# Patient Record
Sex: FEMALE | Race: WHITE | NOT HISPANIC OR LATINO | URBAN - METROPOLITAN AREA
[De-identification: names, ages, dates, MRNs, and addresses within clinical notes are randomized per-mention and may not be internally consistent; named-entity substitution may affect disease eponyms.]

---

## 2020-04-27 ENCOUNTER — TELEMEDICINE (OUTPATIENT)
Dept: FAMILY MEDICINE CLINIC | Facility: CLINIC | Age: 60
End: 2020-04-27
Payer: COMMERCIAL

## 2020-04-27 VITALS — HEIGHT: 65 IN | BODY MASS INDEX: 24.66 KG/M2 | WEIGHT: 148 LBS

## 2020-04-27 DIAGNOSIS — F41.1 ANXIETY, GENERALIZED: Primary | ICD-10-CM

## 2020-04-27 DIAGNOSIS — F33.1 MAJOR DEPRESSIVE DISORDER, RECURRENT, MODERATE (HCC): ICD-10-CM

## 2020-04-27 PROCEDURE — 99203 OFFICE O/P NEW LOW 30 MIN: CPT | Performed by: FAMILY MEDICINE

## 2020-04-27 RX ORDER — ESCITALOPRAM OXALATE 10 MG/1
TABLET ORAL
Qty: 30 TABLET | Refills: 1 | Status: SHIPPED | OUTPATIENT
Start: 2020-04-27 | End: 2020-06-08 | Stop reason: SDUPTHER

## 2020-04-27 RX ORDER — HYDROXYZINE HYDROCHLORIDE 25 MG/1
25 TABLET, FILM COATED ORAL EVERY 6 HOURS PRN
Qty: 30 TABLET | Refills: 0 | Status: SHIPPED | OUTPATIENT
Start: 2020-04-27

## 2020-05-11 ENCOUNTER — TELEMEDICINE (OUTPATIENT)
Dept: FAMILY MEDICINE CLINIC | Facility: CLINIC | Age: 60
End: 2020-05-11
Payer: COMMERCIAL

## 2020-05-11 VITALS — HEIGHT: 65 IN | WEIGHT: 148 LBS | BODY MASS INDEX: 24.66 KG/M2

## 2020-05-11 DIAGNOSIS — Z12.31 SCREENING MAMMOGRAM, ENCOUNTER FOR: ICD-10-CM

## 2020-05-11 DIAGNOSIS — F41.1 ANXIETY, GENERALIZED: Primary | ICD-10-CM

## 2020-05-11 DIAGNOSIS — Z80.0 FAMILY HISTORY OF COLON CANCER REQUIRING SCREENING COLONOSCOPY: ICD-10-CM

## 2020-05-11 DIAGNOSIS — Z12.4 CERVICAL CANCER SCREENING: ICD-10-CM

## 2020-05-11 DIAGNOSIS — F33.1 MAJOR DEPRESSIVE DISORDER, RECURRENT, MODERATE (HCC): ICD-10-CM

## 2020-05-11 PROCEDURE — 99214 OFFICE O/P EST MOD 30 MIN: CPT | Performed by: FAMILY MEDICINE

## 2020-06-08 ENCOUNTER — TELEMEDICINE (OUTPATIENT)
Dept: FAMILY MEDICINE CLINIC | Facility: CLINIC | Age: 60
End: 2020-06-08
Payer: COMMERCIAL

## 2020-06-08 VITALS — WEIGHT: 148 LBS | BODY MASS INDEX: 24.66 KG/M2 | HEIGHT: 65 IN

## 2020-06-08 DIAGNOSIS — F41.1 ANXIETY, GENERALIZED: ICD-10-CM

## 2020-06-08 DIAGNOSIS — F33.1 MAJOR DEPRESSIVE DISORDER, RECURRENT, MODERATE (HCC): ICD-10-CM

## 2020-06-08 PROCEDURE — 3008F BODY MASS INDEX DOCD: CPT | Performed by: FAMILY MEDICINE

## 2020-06-08 PROCEDURE — 99213 OFFICE O/P EST LOW 20 MIN: CPT | Performed by: FAMILY MEDICINE

## 2020-06-08 RX ORDER — DIPHENOXYLATE HYDROCHLORIDE AND ATROPINE SULFATE 2.5; .025 MG/1; MG/1
1 TABLET ORAL DAILY
COMMUNITY

## 2020-06-08 RX ORDER — ESCITALOPRAM OXALATE 10 MG/1
10 TABLET ORAL DAILY
Qty: 90 TABLET | Refills: 1 | Status: SHIPPED | OUTPATIENT
Start: 2020-06-08

## 2021-06-16 ENCOUNTER — TELEPHONE (OUTPATIENT)
Dept: FAMILY MEDICINE CLINIC | Facility: CLINIC | Age: 61
End: 2021-06-16

## 2021-06-16 NOTE — TELEPHONE ENCOUNTER
Patient is overdue for annual physical  Please call to schedule  Please confirm patient is still seeing our office as their PCP  If they patient is seeing another provider please obtain new PCP information and route back to me directly

## 2021-06-25 ENCOUNTER — TELEPHONE (OUTPATIENT)
Dept: FAMILY MEDICINE CLINIC | Facility: CLINIC | Age: 61
End: 2021-06-25

## 2021-06-25 NOTE — TELEPHONE ENCOUNTER
MALATHI and letter sent out    Dear Ms Jimenez:    A recent review of your medical records indicate that you are now due for your annual exam     Please call our office at your earliest convenience to schedule an appointment  If you have already had the appropriate care elsewhere, please call our office and provide us with that information so we can update your records  Your partners in [facility],    Marce Ac, [facility] Aguilar, Flavio Ariza, and Jatin Laughlin

## 2022-03-17 ENCOUNTER — TELEPHONE (OUTPATIENT)
Dept: FAMILY MEDICINE CLINIC | Facility: CLINIC | Age: 62
End: 2022-03-17

## 2022-03-22 NOTE — TELEPHONE ENCOUNTER
MALATHI and sent my chart message for patient to please contact the office to schedule this appointment or to please let us know if patient is still continuing with Dr Jadon Sarabia or not then we would take her off the call list

## 2025-05-27 ENCOUNTER — APPOINTMENT (EMERGENCY)
Dept: RADIOLOGY | Facility: HOSPITAL | Age: 65
DRG: 291 | End: 2025-05-27
Payer: MEDICARE

## 2025-05-27 ENCOUNTER — APPOINTMENT (EMERGENCY)
Dept: VASCULAR ULTRASOUND | Facility: HOSPITAL | Age: 65
DRG: 291 | End: 2025-05-27
Payer: MEDICARE

## 2025-05-27 ENCOUNTER — HOSPITAL ENCOUNTER (INPATIENT)
Facility: HOSPITAL | Age: 65
LOS: 4 days | Discharge: HOME | DRG: 291 | End: 2025-05-31
Attending: EMERGENCY MEDICINE | Admitting: INTERNAL MEDICINE
Payer: MEDICARE

## 2025-05-27 ENCOUNTER — APPOINTMENT (EMERGENCY)
Dept: CT IMAGING | Facility: HOSPITAL | Age: 65
DRG: 291 | End: 2025-05-27
Payer: MEDICARE

## 2025-05-27 DIAGNOSIS — I48.91 A-FIB (HCC): ICD-10-CM

## 2025-05-27 DIAGNOSIS — J90 PLEURAL EFFUSION: ICD-10-CM

## 2025-05-27 DIAGNOSIS — F10.90 ALCOHOL USE DISORDER: Primary | ICD-10-CM

## 2025-05-27 DIAGNOSIS — I50.9 CHF (CONGESTIVE HEART FAILURE) (HCC): ICD-10-CM

## 2025-05-27 LAB
2HR DELTA HS TROPONIN: -1 NG/L
ALBUMIN SERPL BCG-MCNC: 3.9 G/DL (ref 3.5–5)
ALBUMIN SERPL BCG-MCNC: 4 G/DL (ref 3.5–5)
ALP SERPL-CCNC: 65 U/L (ref 34–104)
ALP SERPL-CCNC: 68 U/L (ref 34–104)
ALT SERPL W P-5'-P-CCNC: 54 U/L (ref 7–52)
ALT SERPL W P-5'-P-CCNC: 55 U/L (ref 7–52)
ANION GAP SERPL CALCULATED.3IONS-SCNC: 10 MMOL/L (ref 4–13)
ANION GAP SERPL CALCULATED.3IONS-SCNC: 11 MMOL/L (ref 4–13)
APTT PPP: 27 SECONDS (ref 23–34)
AST SERPL W P-5'-P-CCNC: 40 U/L (ref 13–39)
AST SERPL W P-5'-P-CCNC: 51 U/L (ref 13–39)
BASE EX.OXY STD BLDV CALC-SCNC: 78.4 % (ref 60–80)
BASE EXCESS BLDV CALC-SCNC: -4.9 MMOL/L
BASOPHILS # BLD AUTO: 0.02 THOUSANDS/ÂΜL (ref 0–0.1)
BASOPHILS # BLD AUTO: 0.03 THOUSANDS/ÂΜL (ref 0–0.1)
BASOPHILS NFR BLD AUTO: 0 % (ref 0–1)
BASOPHILS NFR BLD AUTO: 1 % (ref 0–1)
BILIRUB SERPL-MCNC: 2.24 MG/DL (ref 0.2–1)
BILIRUB SERPL-MCNC: 2.43 MG/DL (ref 0.2–1)
BNP SERPL-MCNC: 647 PG/ML (ref 0–100)
BUN SERPL-MCNC: 9 MG/DL (ref 5–25)
BUN SERPL-MCNC: 9 MG/DL (ref 5–25)
CA-I BLD-SCNC: 1.12 MMOL/L (ref 1.12–1.32)
CALCIUM SERPL-MCNC: 8.6 MG/DL (ref 8.4–10.2)
CALCIUM SERPL-MCNC: 8.8 MG/DL (ref 8.4–10.2)
CARDIAC TROPONIN I PNL SERPL HS: 19 NG/L (ref ?–50)
CARDIAC TROPONIN I PNL SERPL HS: 20 NG/L (ref ?–50)
CHLORIDE SERPL-SCNC: 104 MMOL/L (ref 96–108)
CHLORIDE SERPL-SCNC: 107 MMOL/L (ref 96–108)
CO2 SERPL-SCNC: 22 MMOL/L (ref 21–32)
CO2 SERPL-SCNC: 23 MMOL/L (ref 21–32)
CREAT SERPL-MCNC: 0.57 MG/DL (ref 0.6–1.3)
CREAT SERPL-MCNC: 0.66 MG/DL (ref 0.6–1.3)
EOSINOPHIL # BLD AUTO: 0.02 THOUSAND/ÂΜL (ref 0–0.61)
EOSINOPHIL # BLD AUTO: 0.09 THOUSAND/ÂΜL (ref 0–0.61)
EOSINOPHIL NFR BLD AUTO: 0 % (ref 0–6)
EOSINOPHIL NFR BLD AUTO: 2 % (ref 0–6)
ERYTHROCYTE [DISTWIDTH] IN BLOOD BY AUTOMATED COUNT: 13.7 % (ref 11.6–15.1)
ERYTHROCYTE [DISTWIDTH] IN BLOOD BY AUTOMATED COUNT: 13.8 % (ref 11.6–15.1)
GFR SERPL CREATININE-BSD FRML MDRD: 92 ML/MIN/1.73SQ M
GFR SERPL CREATININE-BSD FRML MDRD: 97 ML/MIN/1.73SQ M
GLUCOSE SERPL-MCNC: 117 MG/DL (ref 65–140)
GLUCOSE SERPL-MCNC: 93 MG/DL (ref 65–140)
HCO3 BLDV-SCNC: 18.9 MMOL/L (ref 24–30)
HCT VFR BLD AUTO: 38.9 % (ref 34.8–46.1)
HCT VFR BLD AUTO: 40.5 % (ref 34.8–46.1)
HGB BLD-MCNC: 12.7 G/DL (ref 11.5–15.4)
HGB BLD-MCNC: 13.1 G/DL (ref 11.5–15.4)
IMM GRANULOCYTES # BLD AUTO: 0.01 THOUSAND/UL (ref 0–0.2)
IMM GRANULOCYTES # BLD AUTO: 0.01 THOUSAND/UL (ref 0–0.2)
IMM GRANULOCYTES NFR BLD AUTO: 0 % (ref 0–2)
IMM GRANULOCYTES NFR BLD AUTO: 0 % (ref 0–2)
INR PPP: 1.16 (ref 0.85–1.19)
LACTATE SERPL-SCNC: 1.2 MMOL/L (ref 0.5–2)
LYMPHOCYTES # BLD AUTO: 0.7 THOUSANDS/ÂΜL (ref 0.6–4.47)
LYMPHOCYTES # BLD AUTO: 1.18 THOUSANDS/ÂΜL (ref 0.6–4.47)
LYMPHOCYTES NFR BLD AUTO: 14 % (ref 14–44)
LYMPHOCYTES NFR BLD AUTO: 24 % (ref 14–44)
MAGNESIUM SERPL-MCNC: 1.7 MG/DL (ref 1.9–2.7)
MCH RBC QN AUTO: 34.2 PG (ref 26.8–34.3)
MCH RBC QN AUTO: 34.4 PG (ref 26.8–34.3)
MCHC RBC AUTO-ENTMCNC: 32.3 G/DL (ref 31.4–37.4)
MCHC RBC AUTO-ENTMCNC: 32.6 G/DL (ref 31.4–37.4)
MCV RBC AUTO: 105 FL (ref 82–98)
MCV RBC AUTO: 106 FL (ref 82–98)
MONOCYTES # BLD AUTO: 0.59 THOUSAND/ÂΜL (ref 0.17–1.22)
MONOCYTES # BLD AUTO: 0.65 THOUSAND/ÂΜL (ref 0.17–1.22)
MONOCYTES NFR BLD AUTO: 12 % (ref 4–12)
MONOCYTES NFR BLD AUTO: 13 % (ref 4–12)
NEUTROPHILS # BLD AUTO: 3.01 THOUSANDS/ÂΜL (ref 1.85–7.62)
NEUTROPHILS # BLD AUTO: 3.65 THOUSANDS/ÂΜL (ref 1.85–7.62)
NEUTS SEG NFR BLD AUTO: 61 % (ref 43–75)
NEUTS SEG NFR BLD AUTO: 73 % (ref 43–75)
NRBC BLD AUTO-RTO: 0 /100 WBCS
NRBC BLD AUTO-RTO: 0 /100 WBCS
O2 CT BLDV-SCNC: 14.6 ML/DL
PCO2 BLDV: 31.8 MM HG (ref 42–50)
PH BLDV: 7.39 [PH] (ref 7.3–7.4)
PHOSPHATE SERPL-MCNC: 3.7 MG/DL (ref 2.3–4.1)
PLATELET # BLD AUTO: 108 THOUSANDS/UL (ref 149–390)
PLATELET # BLD AUTO: 124 THOUSANDS/UL (ref 149–390)
PMV BLD AUTO: 10.7 FL (ref 8.9–12.7)
PMV BLD AUTO: 10.7 FL (ref 8.9–12.7)
PO2 BLDV: 45.4 MM HG (ref 35–45)
POTASSIUM SERPL-SCNC: 3.8 MMOL/L (ref 3.5–5.3)
POTASSIUM SERPL-SCNC: 5.1 MMOL/L (ref 3.5–5.3)
PROT SERPL-MCNC: 6.1 G/DL (ref 6.4–8.4)
PROT SERPL-MCNC: 6.1 G/DL (ref 6.4–8.4)
PROTHROMBIN TIME: 15.6 SECONDS (ref 12.3–15)
RBC # BLD AUTO: 3.71 MILLION/UL (ref 3.81–5.12)
RBC # BLD AUTO: 3.81 MILLION/UL (ref 3.81–5.12)
SODIUM SERPL-SCNC: 137 MMOL/L (ref 135–147)
SODIUM SERPL-SCNC: 140 MMOL/L (ref 135–147)
WBC # BLD AUTO: 4.91 THOUSAND/UL (ref 4.31–10.16)
WBC # BLD AUTO: 5.05 THOUSAND/UL (ref 4.31–10.16)

## 2025-05-27 PROCEDURE — 96374 THER/PROPH/DIAG INJ IV PUSH: CPT

## 2025-05-27 PROCEDURE — 80053 COMPREHEN METABOLIC PANEL: CPT

## 2025-05-27 PROCEDURE — 85610 PROTHROMBIN TIME: CPT | Performed by: EMERGENCY MEDICINE

## 2025-05-27 PROCEDURE — 93005 ELECTROCARDIOGRAM TRACING: CPT

## 2025-05-27 PROCEDURE — 93971 EXTREMITY STUDY: CPT | Performed by: SURGERY

## 2025-05-27 PROCEDURE — 84100 ASSAY OF PHOSPHORUS: CPT

## 2025-05-27 PROCEDURE — 96375 TX/PRO/DX INJ NEW DRUG ADDON: CPT

## 2025-05-27 PROCEDURE — 99285 EMERGENCY DEPT VISIT HI MDM: CPT

## 2025-05-27 PROCEDURE — 84484 ASSAY OF TROPONIN QUANT: CPT

## 2025-05-27 PROCEDURE — 96361 HYDRATE IV INFUSION ADD-ON: CPT

## 2025-05-27 PROCEDURE — 36415 COLL VENOUS BLD VENIPUNCTURE: CPT

## 2025-05-27 PROCEDURE — 94002 VENT MGMT INPAT INIT DAY: CPT

## 2025-05-27 PROCEDURE — 85025 COMPLETE CBC W/AUTO DIFF WBC: CPT

## 2025-05-27 PROCEDURE — 94760 N-INVAS EAR/PLS OXIMETRY 1: CPT

## 2025-05-27 PROCEDURE — 71046 X-RAY EXAM CHEST 2 VIEWS: CPT

## 2025-05-27 PROCEDURE — 5A09357 ASSISTANCE WITH RESPIRATORY VENTILATION, LESS THAN 24 CONSECUTIVE HOURS, CONTINUOUS POSITIVE AIRWAY PRESSURE: ICD-10-PCS | Performed by: INTERNAL MEDICINE

## 2025-05-27 PROCEDURE — 99291 CRITICAL CARE FIRST HOUR: CPT | Performed by: EMERGENCY MEDICINE

## 2025-05-27 PROCEDURE — 83605 ASSAY OF LACTIC ACID: CPT

## 2025-05-27 PROCEDURE — 83735 ASSAY OF MAGNESIUM: CPT

## 2025-05-27 PROCEDURE — 96376 TX/PRO/DX INJ SAME DRUG ADON: CPT

## 2025-05-27 PROCEDURE — 96365 THER/PROPH/DIAG IV INF INIT: CPT

## 2025-05-27 PROCEDURE — 93971 EXTREMITY STUDY: CPT

## 2025-05-27 PROCEDURE — 85730 THROMBOPLASTIN TIME PARTIAL: CPT | Performed by: EMERGENCY MEDICINE

## 2025-05-27 PROCEDURE — 83880 ASSAY OF NATRIURETIC PEPTIDE: CPT | Performed by: EMERGENCY MEDICINE

## 2025-05-27 PROCEDURE — 82805 BLOOD GASES W/O2 SATURATION: CPT | Performed by: EMERGENCY MEDICINE

## 2025-05-27 PROCEDURE — 82330 ASSAY OF CALCIUM: CPT

## 2025-05-27 PROCEDURE — NC001 PR NO CHARGE: Performed by: INTERNAL MEDICINE

## 2025-05-27 PROCEDURE — 71275 CT ANGIOGRAPHY CHEST: CPT

## 2025-05-27 RX ORDER — DILTIAZEM HYDROCHLORIDE 5 MG/ML
20 INJECTION INTRAVENOUS ONCE
Status: DISCONTINUED | OUTPATIENT
Start: 2025-05-27 | End: 2025-05-27

## 2025-05-27 RX ORDER — FUROSEMIDE 10 MG/ML
40 INJECTION INTRAMUSCULAR; INTRAVENOUS ONCE
Status: COMPLETED | OUTPATIENT
Start: 2025-05-27 | End: 2025-05-27

## 2025-05-27 RX ORDER — METOPROLOL TARTRATE 1 MG/ML
5 INJECTION, SOLUTION INTRAVENOUS EVERY 6 HOURS
Status: DISCONTINUED | OUTPATIENT
Start: 2025-05-28 | End: 2025-05-27

## 2025-05-27 RX ORDER — HEPARIN SODIUM 1000 [USP'U]/ML
2000 INJECTION, SOLUTION INTRAVENOUS; SUBCUTANEOUS EVERY 6 HOURS PRN
Status: DISCONTINUED | OUTPATIENT
Start: 2025-05-27 | End: 2025-05-30

## 2025-05-27 RX ORDER — METOPROLOL TARTRATE 1 MG/ML
5 INJECTION, SOLUTION INTRAVENOUS EVERY 6 HOURS
Status: DISCONTINUED | OUTPATIENT
Start: 2025-05-28 | End: 2025-05-28

## 2025-05-27 RX ORDER — CHLORHEXIDINE GLUCONATE ORAL RINSE 1.2 MG/ML
15 SOLUTION DENTAL EVERY 12 HOURS SCHEDULED
Status: DISCONTINUED | OUTPATIENT
Start: 2025-05-27 | End: 2025-05-28

## 2025-05-27 RX ORDER — HEPARIN SODIUM 10000 [USP'U]/100ML
3-20 INJECTION, SOLUTION INTRAVENOUS
Status: DISCONTINUED | OUTPATIENT
Start: 2025-05-27 | End: 2025-05-30

## 2025-05-27 RX ORDER — HYDROXYZINE HYDROCHLORIDE 25 MG/1
25 TABLET, FILM COATED ORAL EVERY 6 HOURS PRN
Status: DISCONTINUED | OUTPATIENT
Start: 2025-05-27 | End: 2025-05-28

## 2025-05-27 RX ORDER — METOPROLOL TARTRATE 1 MG/ML
2.5 INJECTION, SOLUTION INTRAVENOUS EVERY 6 HOURS
Status: DISCONTINUED | OUTPATIENT
Start: 2025-05-27 | End: 2025-05-27

## 2025-05-27 RX ORDER — DILTIAZEM HYDROCHLORIDE 5 MG/ML
15 INJECTION INTRAVENOUS ONCE
Status: COMPLETED | OUTPATIENT
Start: 2025-05-27 | End: 2025-05-27

## 2025-05-27 RX ORDER — ESCITALOPRAM OXALATE 10 MG/1
10 TABLET ORAL DAILY
Status: DISCONTINUED | OUTPATIENT
Start: 2025-05-28 | End: 2025-05-28

## 2025-05-27 RX ORDER — MAGNESIUM SULFATE HEPTAHYDRATE 40 MG/ML
2 INJECTION, SOLUTION INTRAVENOUS ONCE
Status: COMPLETED | OUTPATIENT
Start: 2025-05-27 | End: 2025-05-27

## 2025-05-27 RX ORDER — HEPARIN SODIUM 1000 [USP'U]/ML
4000 INJECTION, SOLUTION INTRAVENOUS; SUBCUTANEOUS EVERY 6 HOURS PRN
Status: DISCONTINUED | OUTPATIENT
Start: 2025-05-27 | End: 2025-05-30

## 2025-05-27 RX ORDER — HEPARIN SODIUM 1000 [USP'U]/ML
4000 INJECTION, SOLUTION INTRAVENOUS; SUBCUTANEOUS ONCE
Status: COMPLETED | OUTPATIENT
Start: 2025-05-27 | End: 2025-05-27

## 2025-05-27 RX ORDER — METOPROLOL TARTRATE 1 MG/ML
5 INJECTION, SOLUTION INTRAVENOUS ONCE
Status: DISCONTINUED | OUTPATIENT
Start: 2025-05-27 | End: 2025-05-27

## 2025-05-27 RX ORDER — DILTIAZEM HYDROCHLORIDE 5 MG/ML
INJECTION INTRAVENOUS
Status: COMPLETED
Start: 2025-05-27 | End: 2025-05-27

## 2025-05-27 RX ORDER — FOLIC ACID 1 MG/1
1 TABLET ORAL DAILY
Status: DISCONTINUED | OUTPATIENT
Start: 2025-05-27 | End: 2025-05-27

## 2025-05-27 RX ORDER — LANOLIN ALCOHOL/MO/W.PET/CERES
100 CREAM (GRAM) TOPICAL DAILY
Status: DISCONTINUED | OUTPATIENT
Start: 2025-05-31 | End: 2025-05-31 | Stop reason: HOSPADM

## 2025-05-27 RX ORDER — LANOLIN ALCOHOL/MO/W.PET/CERES
100 CREAM (GRAM) TOPICAL DAILY
Status: DISCONTINUED | OUTPATIENT
Start: 2025-05-27 | End: 2025-05-27

## 2025-05-27 RX ADMIN — Medication 100 MG: at 14:41

## 2025-05-27 RX ADMIN — FOLIC ACID 1 MG: 1 TABLET ORAL at 14:41

## 2025-05-27 RX ADMIN — IOHEXOL 85 ML: 350 INJECTION, SOLUTION INTRAVENOUS at 15:18

## 2025-05-27 RX ADMIN — FUROSEMIDE 40 MG: 10 INJECTION, SOLUTION INTRAMUSCULAR; INTRAVENOUS at 19:04

## 2025-05-27 RX ADMIN — METOROPROLOL TARTRATE 2.5 MG: 5 INJECTION, SOLUTION INTRAVENOUS at 22:24

## 2025-05-27 RX ADMIN — DILTIAZEM HYDROCHLORIDE 15 MG: 5 INJECTION, SOLUTION INTRAVENOUS at 15:37

## 2025-05-27 RX ADMIN — SODIUM CHLORIDE 1000 ML: 0.9 INJECTION, SOLUTION INTRAVENOUS at 14:00

## 2025-05-27 RX ADMIN — HEPARIN SODIUM 12 UNITS/KG/HR: 10000 INJECTION, SOLUTION INTRAVENOUS at 20:15

## 2025-05-27 RX ADMIN — MULTIPLE VITAMINS W/ MINERALS TAB 1 TABLET: TAB ORAL at 14:41

## 2025-05-27 RX ADMIN — SODIUM CHLORIDE 569 MG: 9 INJECTION, SOLUTION INTRAVENOUS at 14:37

## 2025-05-27 RX ADMIN — METOROPROLOL TARTRATE 5 MG: 5 INJECTION, SOLUTION INTRAVENOUS at 23:53

## 2025-05-27 RX ADMIN — METOROPROLOL TARTRATE 2.5 MG: 5 INJECTION, SOLUTION INTRAVENOUS at 16:50

## 2025-05-27 RX ADMIN — SODIUM CHLORIDE 10 MG/HR: 9 INJECTION, SOLUTION INTRAVENOUS at 15:40

## 2025-05-27 RX ADMIN — HEPARIN SODIUM 4000 UNITS: 1000 INJECTION, SOLUTION INTRAVENOUS; SUBCUTANEOUS at 20:15

## 2025-05-27 RX ADMIN — MAGNESIUM SULFATE HEPTAHYDRATE 2 G: 40 INJECTION, SOLUTION INTRAVENOUS at 21:25

## 2025-05-27 NOTE — ED PROVIDER NOTES
Time reflects when diagnosis was documented in both MDM as applicable and the Disposition within this note       Time User Action Codes Description Comment    5/27/2025  7:02 PM Angelina Mattson Add [J90] Pleural effusion     5/27/2025  9:11 PM Angelina Mattson Add [I48.91] A-fib ([facility])     5/27/2025  9:11 PM Angelina Mattson Add [I50.9] CHF (congestive heart failure) ([facility])     5/28/2025  6:00 AM Mirlande Moran Modify [J90] Pleural effusion     5/28/2025  6:00 AM Mirlande Moran Add [F10.90] Alcohol use disorder           ED Disposition       ED Disposition   Admit    Condition   Stable    Date/Time   Tue May 27, 2025  6:47 PM    Comment   Case was discussed with Critical Care and the patient's admission status was agreed to be Admission Status: inpatient status to the service of Dr. Lyon.               Assessment & Plan       Medical Decision Making  Kourtney Jimenez is a 64 y.o. female with a past medical history of anxiety, depression being evaluated for rapid heart rate, chest pain, shortness of breath.    Differential diagnoses include but not limited to: DVT/PE, A-fib, SVT, pneumonia, alcohol withdrawal, electrolyte abnormality    Initial workup to include: CBC, CMP, troponin, EKG, venous duplex, CT PE    See ED Course for data/imaging interpretation and further MDM.    Disposition: On exam, patient initially tachycardic to 160s with no prior history of A-fib.  Shortness of breath with pleuritic chest pain and left lower extremity swelling concerning for DVT/PE.  Venous duplex of left lower extremity without evidence of DVT.  CT PE study without evidence of PE but notable for moderate bilateral effusions with groundglass haziness in the lungs concerning for possible CHF.  IV diuresis started with 40 mg of Lasix.  Patient given Cardizem bolus via EMS prior to arrival in ED, persistently tachycardic.  Given additional 15 mg Cardizem bolus and started on drip.  Patient also endorses heavy drinking history without  any alcohol usage for past several days, tremulous on exam.  Concerning for possible alcohol withdrawal.  Given 10 mg/kg phenobarbital load.  Patient saturating 2 low 90s on nasal cannula, later requiring increased oxygen demand and placed on BiPAP in ED.  Suspect possible worsening of respiratory status due to CHF, improving on BiPAP.  Discussed case with critical care medicine for admission to ICU for stepdown 1 level of care.  Patient amenable to admission.      Amount and/or Complexity of Data Reviewed  Labs: ordered. Decision-making details documented in ED Course.  Radiology: ordered.    Risk  OTC drugs.  Prescription drug management.  Decision regarding hospitalization.        ED Course as of 05/28/25 0936   Tue May 27, 2025   1413 CBC and differential(!)  No leukocytosis, anemia, thrombocytopenia     1414 Comprehensive metabolic panel(!)  No electrolyte abnormalities, no FREDDIE. Mild transaminitis present   1414 hs TnI 0hr: 20  Elevated, no prior for comparison.  Plan to trend at 2 hours   1422 Negative for DVT in left lower extremity per ultrasound tech       Medications   multivitamin-minerals (CENTRUM) tablet 1 tablet ( Oral Held by provider 5/27/25 2239)   heparin (porcine) 25,000 units in 0.45% NaCl 250 mL infusion (premix) (12 Units/kg/hr × 75 kg (Order-Specific) Intravenous New Bag 5/27/25 2015)   heparin (porcine) injection 4,000 Units (has no administration in time range)   heparin (porcine) injection 2,000 Units (has no administration in time range)   chlorhexidine (PERIDEX) 0.12 % oral rinse 15 mL (0 mL Mouth/Throat Hold 5/27/25 2051)   escitalopram (LEXAPRO) tablet 10 mg ( Oral Held by provider 5/27/25 2100)   hydrOXYzine HCL (ATARAX) tablet 25 mg ( Oral Held by provider 5/27/25 2100)   folic acid 1 mg in sodium chloride 0.9 % 50 mL IVPB (has no administration in time range)   thiamine (VITAMIN B1) 500 mg in dextrose 5 % 100 mL IVPB (has no administration in time range)   thiamine tablet 100 mg (has  no administration in time range)   diltiazem (CARDIZEM) injection 15 mg (0 mg Intravenous Given to EMS 5/27/25 1337)   sodium chloride 0.9 % bolus 1,000 mL (0 mL Intravenous Stopped 5/27/25 1734)   PHENobarbital 569 mg in sodium chloride 0.9 % 100 mL IVPB (569 mg Intravenous Given 5/27/25 1437)   diltiazem (CARDIZEM) injection 15 mg (15 mg Intravenous Given 5/27/25 1537)   iohexol (OMNIPAQUE) 350 MG/ML injection (MULTI-DOSE) 85 mL (85 mL Intravenous Given 5/27/25 1518)   furosemide (LASIX) injection 40 mg (40 mg Intravenous Given 5/27/25 1904)   heparin (porcine) injection 4,000 Units (4,000 Units Intravenous Given 5/27/25 2015)   magnesium sulfate 2 g/50 mL IVPB (premix) 2 g (0 g Intravenous Stopped 5/27/25 2227)       ED Risk Strat Scores                 CIWA-Ar Score       Row Name 05/28/25 0728 05/28/25 0500 05/28/25 0400       [facility]    Blood Pressure -- 115/68 --    Pulse -- 132 --    Nausea and Vomiting 0 -- 0    Tactile Disturbances 0 -- 0    Tremor 0 -- 0    Auditory Disturbances 0 -- 0    Paroxysmal Sweats 2 -- 2    Visual Disturbances 0 -- 0    Anxiety 0 -- 0    Headache, Fullness in Head 0 -- 0    Agitation 0 -- 0    Orientation and Clouding of Sensorium 0 -- 0    [facility] Total 2 -- 2      Row Name 05/28/25 0000 05/27/25 2015 05/27/25 1615       [facility]    Blood Pressure 106/84 -- 110/88    Pulse 141 -- 132    Nausea and Vomiting 0 0 0    Tactile Disturbances 0 0 0    Tremor 0 0 1    Auditory Disturbances 0 0 0    Paroxysmal Sweats 1 1 2    Visual Disturbances 0 0 0    Anxiety 0 0 0    Headache, Fullness in Head 0 0 0    Agitation 2 0 0    Orientation and Clouding of Sensorium 0 0 0    [facility]-Ar Total 3 1 3      Row Name 05/27/25 1417 05/27/25 1415          [facility]-Ar    Nausea and Vomiting 0 0     Tactile Disturbances 3 3     Tremor 4 4     Auditory Disturbances 0 0     Paroxysmal Sweats 4 4     Visual Disturbances 0 0     Anxiety 4 4     Headache, Fullness in Head 0 0     Agitation 0 0     Orientation and  Clouding of Sensorium 0 0     [facility]-Ar Total 15 15                     No data recorded                            History of Present Illness       Chief Complaint   Patient presents with    Chest Pain     Pt arrives via EMS from urgent care with chest palpitations since Saturday. Noted to be in rapid a fib. +SOB       Past Medical History[1]   Past Surgical History[2]   Family History[3]   Social History[4]   E-Cigarette/Vaping    E-Cigarette Use Never User       E-Cigarette/Vaping Substances    Nicotine No     THC No     CBD No     Flavoring No     Other No     Unknown No       I have reviewed and agree with the history as documented.     Kourtney Jimenez is a 64 y.o. female with a past medical history of anxiety, depression being evaluated for rapid heart rate, chest pain, shortness of breath.  Patient reports feeling ill at the end of last week, and been taking Bronkaid for treatment of bronchitis, she reports being sedentary at home secondary to feeling ill.  Reports new chest palpitations and increasing shortness of breath since 2 days ago which have persisted since onset.  Patient also endorses heavy drinking history drinking a boxed wine every 2 days.  Reports that she has not had a drink for 2 days due to feeling ill.  She denies any prior history of ACS, DVT/PE, A-fib, cardiac arrhythmias.  She denies any fevers, chills.  Does endorse nonproductive cough.  No abdominal pain, nausea, vomiting, diarrhea, constipation.            Chest Pain  Associated symptoms: palpitations and shortness of breath        Review of Systems   Respiratory:  Positive for shortness of breath.    Cardiovascular:  Positive for chest pain and palpitations.   All other systems reviewed and are negative.          Objective       ED Triage Vitals   Temperature Pulse Blood Pressure Respirations SpO2 Patient Position - Orthostatic VS   05/27/25 1332 05/27/25 1331 05/27/25 1331 05/27/25 1331 05/27/25 1331 05/27/25 1331   98 °F (36.7 °C) (!)  164 116/88 (!) 32 92 % Lying      Temp Source Heart Rate Source BP Location FiO2 (%) Pain Score    05/27/25 1332 05/27/25 1331 05/27/25 1331 05/27/25 1941 05/27/25 2345    Oral Monitor Right arm 30 No Pain      Vitals      Date and Time Temp Pulse SpO2 Resp BP Pain Score FACES Pain Rating User   05/28/25 0800 -- 129 97 % 19 111/70 -- -- HNW   05/28/25 0728 -- -- 98 % -- -- -- -- TA   05/28/25 0728 97.9 °F (36.6 °C) 139 -- 20 115/59 -- -- AC   05/28/25 0630 -- 145 98 % 25 91/64 -- -- EW   05/28/25 0600 -- 140 98 % 26 107/64 -- -- EW   05/28/25 0534 -- 136 98 % 20 112/84 -- -- EW   05/28/25 0515 -- 133 98 % 20 95/61 -- -- EW   05/28/25 0500 -- 132 -- -- 115/68 -- -- EW   05/28/25 0430 -- 134 98 % 19 91/64 -- -- EW   05/28/25 0415 -- -- -- -- 99/60 -- -- EW   05/28/25 0400 -- 129 98 % 14 88/71 No Pain -- EW   05/28/25 0330 -- 134 97 % 13 94/68 -- -- EW   05/28/25 0300 -- 135 98 % 20 97/78 -- -- EW   05/28/25 0245 -- 127 100 % 19 101/80 -- -- EW   05/28/25 0230 -- 124 97 % 19 91/76 -- -- EW   05/28/25 0200 -- 131 96 % 19 121/71 -- -- EW   05/28/25 0130 -- -- 97 % -- -- -- -- MD   05/28/25 0107 -- -- 99 % -- -- -- -- MD   05/28/25 0002 -- -- -- -- -- No Pain -- EW   05/28/25 0000 98.4 °F (36.9 °C) 141 96 % 16 106/84 -- -- EW   05/27/25 2345 -- -- -- -- -- No Pain -- EW   05/27/25 2328 -- -- 95 % -- -- -- -- MO   05/27/25 2300 -- 136 96 % 20 109/83 -- -- KB   05/27/25 2215 -- 148 97 % 24 103/81 -- -- KB   05/27/25 2015 -- 148 95 % -- 108/84 -- -- JH   05/27/25 1941 -- -- 97 % -- -- -- -- MO   05/27/25 1915 -- 143 98 % 30 104/82 -- -- KB   05/27/25 1845 -- 142 98 % -- 112/88 -- -- KB   05/27/25 1815 -- 136 95 % -- 132/69 -- -- KB   05/27/25 1730 -- 121 96 % 22 104/75 -- -- KB   05/27/25 1628 -- 112 97 % 27 -- -- -- KB   05/27/25 1618 -- 113 -- 26 -- -- -- KB   05/27/25 1615 -- 132 -- -- 110/88 -- -- KB   05/27/25 1555 -- 123 -- -- -- -- -- KB   05/27/25 1545 -- 146 93 % -- 110/86 -- -- KB   05/27/25 1540 -- 147 -- --  113/90 -- -- KB   05/27/25 1430 -- 159 97 % 28 114/81 -- -- TB   05/27/25 1415 -- 162 97 % -- 117/77 -- -- DB   05/27/25 1332 98 °F (36.7 °C) -- -- -- -- -- -- TB   05/27/25 1331 -- 164 92 % 32 116/88 -- -- TB            Physical Exam  Constitutional:       Appearance: Normal appearance. She is ill-appearing.     Eyes:      Extraocular Movements: Extraocular movements intact.      Conjunctiva/sclera: Conjunctivae normal.      Pupils: Pupils are equal, round, and reactive to light.       Cardiovascular:      Rate and Rhythm: Tachycardia present. Rhythm irregular.      Pulses: Normal pulses.      Heart sounds: Normal heart sounds. No murmur heard.     No friction rub. No gallop.   Pulmonary:      Effort: Pulmonary effort is normal. No respiratory distress.      Breath sounds: Normal breath sounds. No stridor. No decreased breath sounds, wheezing, rhonchi or rales.   Abdominal:      General: Abdomen is flat. Bowel sounds are normal. There is no distension.      Palpations: Abdomen is soft.      Tenderness: There is no abdominal tenderness. There is no right CVA tenderness, left CVA tenderness, guarding or rebound.     Musculoskeletal:      Right lower leg: Normal.      Left lower leg: Swelling present. Edema present.      Comments: No tenderness to deep venous system of left lower extremity     Skin:     General: Skin is warm and dry.      Capillary Refill: Capillary refill takes less than 2 seconds.     Neurological:      General: No focal deficit present.      Mental Status: She is alert and oriented to person, place, and time. Mental status is at baseline.         Results Reviewed       Procedure Component Value Units Date/Time    CBC and differential [[id number]]  (Abnormal) Collected: 05/28/25 1545    Lab Status: Final result Specimen: Blood from Arm, Left Updated: 05/28/25 0571     WBC 4.07 Thousand/uL      RBC 3.44 Million/uL      Hemoglobin 11.8 g/dL      Hematocrit 37.7 %       fL      MCH 34.3 pg       MCHC 31.3 g/dL      RDW 13.7 %      MPV 10.7 fL      Platelets 102 Thousands/uL      nRBC 0 /100 WBCs      Segmented % 60 %      Immature Grans % 1 %      Lymphocytes % 23 %      Monocytes % 11 %      Eosinophils Relative 4 %      Basophils Relative 1 %      Absolute Neutrophils 2.43 Thousands/µL      Absolute Immature Grans 0.02 Thousand/uL      Absolute Lymphocytes 0.95 Thousands/µL      Absolute Monocytes 0.46 Thousand/µL      Eosinophils Absolute 0.18 Thousand/µL      Basophils Absolute 0.03 Thousands/µL     Comprehensive metabolic panel [[id number]]  (Abnormal) Collected: 05/28/25 0433    Lab Status: Final result Specimen: Blood from Arm, Left Updated: 05/28/25 0509     Sodium 139 mmol/L      Potassium 3.3 mmol/L      Chloride 106 mmol/L      CO2 21 mmol/L      ANION GAP 12 mmol/L      BUN 9 mg/dL      Creatinine 0.53 mg/dL      Glucose 75 mg/dL      Calcium 7.8 mg/dL      Corrected Calcium 8.3 mg/dL      AST 33 U/L      ALT 43 U/L      Alkaline Phosphatase 61 U/L      Total Protein 5.3 g/dL      Albumin 3.4 g/dL      Total Bilirubin 1.78 mg/dL      eGFR 99 ml/min/1.73sq m     Narrative:      National Kidney Disease Foundation guidelines for Chronic Kidney Disease (CKD):     Stage 1 with normal or high GFR (GFR > 90 mL/min/1.73 square meters)    Stage 2 Mild CKD (GFR = 60-89 mL/min/1.73 square meters)    Stage 3A Moderate CKD (GFR = 45-59 mL/min/1.73 square meters)    Stage 3B Moderate CKD (GFR = 30-44 mL/min/1.73 square meters)    Stage 4 Severe CKD (GFR = 15-29 mL/min/1.73 square meters)    Stage 5 End Stage CKD (GFR <15 mL/min/1.73 square meters)  Note: GFR calculation is accurate only with a steady state creatinine    Magnesium [[id number]]  (Normal) Collected: 05/28/25 0433    Lab Status: Final result Specimen: Blood from Arm, Left Updated: 05/28/25 0509     Magnesium 2.0 mg/dL     Phosphorus [[id number]]  (Normal) Collected: 05/28/25 0433    Lab Status: Final result Specimen: Blood from Arm, Left Updated:  05/28/25 0509     Phosphorus 3.7 mg/dL     APTT [[id number]]  (Abnormal) Collected: 05/28/25 0221    Lab Status: Final result Specimen: Blood from Arm, Left Updated: 05/28/25 0338     PTT 86 seconds     Narrative:      Verified by repeat analysis    Comprehensive metabolic panel [[id number]]  (Abnormal) Collected: 05/27/25 2007    Lab Status: Final result Specimen: Blood from Arm, Left Updated: 05/27/25 2042     Sodium 140 mmol/L      Potassium 3.8 mmol/L      Chloride 107 mmol/L      CO2 23 mmol/L      ANION GAP 10 mmol/L      BUN 9 mg/dL      Creatinine 0.57 mg/dL      Glucose 93 mg/dL      Calcium 8.8 mg/dL      AST 40 U/L      ALT 54 U/L      Alkaline Phosphatase 68 U/L      Total Protein 6.1 g/dL      Albumin 4.0 g/dL      Total Bilirubin 2.24 mg/dL      eGFR 97 ml/min/1.73sq m     Narrative:      National Kidney Disease Foundation guidelines for Chronic Kidney Disease (CKD):     Stage 1 with normal or high GFR (GFR > 90 mL/min/1.73 square meters)    Stage 2 Mild CKD (GFR = 60-89 mL/min/1.73 square meters)    Stage 3A Moderate CKD (GFR = 45-59 mL/min/1.73 square meters)    Stage 3B Moderate CKD (GFR = 30-44 mL/min/1.73 square meters)    Stage 4 Severe CKD (GFR = 15-29 mL/min/1.73 square meters)    Stage 5 End Stage CKD (GFR <15 mL/min/1.73 square meters)  Note: GFR calculation is accurate only with a steady state creatinine    Magnesium [[id number]]  (Abnormal) Collected: 05/27/25 2007    Lab Status: Final result Specimen: Blood from Arm, Left Updated: 05/27/25 2042     Magnesium 1.7 mg/dL     Phosphorus [[id number]]  (Normal) Collected: 05/27/25 2007    Lab Status: Final result Specimen: Blood from Arm, Left Updated: 05/27/25 2042     Phosphorus 3.7 mg/dL     Lactic acid, plasma (w/reflex if result > 2.0) [[id number]]  (Normal) Collected: 05/27/25 2007    Lab Status: Final result Specimen: Blood from Arm, Left Updated: 05/27/25 2041     LACTIC ACID 1.2 mmol/L     Narrative:      Result may be elevated if tourniquet  was used during collection.    CBC and differential [[id number]]  (Abnormal) Collected: 05/27/25 2007    Lab Status: Final result Specimen: Blood from Arm, Left Updated: 05/27/25 2018     WBC 4.91 Thousand/uL      RBC 3.71 Million/uL      Hemoglobin 12.7 g/dL      Hematocrit 38.9 %       fL      MCH 34.2 pg      MCHC 32.6 g/dL      RDW 13.8 %      MPV 10.7 fL      Platelets 124 Thousands/uL      nRBC 0 /100 WBCs      Segmented % 61 %      Immature Grans % 0 %      Lymphocytes % 24 %      Monocytes % 12 %      Eosinophils Relative 2 %      Basophils Relative 1 %      Absolute Neutrophils 3.01 Thousands/µL      Absolute Immature Grans 0.01 Thousand/uL      Absolute Lymphocytes 1.18 Thousands/µL      Absolute Monocytes 0.59 Thousand/µL      Eosinophils Absolute 0.09 Thousand/µL      Basophils Absolute 0.03 Thousands/µL     Calcium, ionized [[id number]]  (Normal) Collected: 05/27/25 2007    Lab Status: Final result Specimen: Blood from Arm, Left Updated: 05/27/25 2014     Calcium, Ionized 1.12 mmol/L     APTT six (6) hours after Heparin bolus/drip initiation or dosing change [[id number]]  (Normal) Collected: 05/27/25 1902    Lab Status: Final result Specimen: Blood from Arm, Right Updated: 05/27/25 1953     PTT 27 seconds     Protime-INR [[id number]]  (Abnormal) Collected: 05/27/25 1902    Lab Status: Final result Specimen: Blood from Arm, Right Updated: 05/27/25 1953     Protime 15.6 seconds      INR 1.16    Narrative:      INR Therapeutic Range    Indication                                             INR Range      Atrial Fibrillation                                               2.0-3.0  Hypercoagulable State                                    2.0.2.3  Left Ventricular Asist Device                            2.0-3.0  Mechanical Heart Valve                                  -    Aortic(with afib, MI, embolism, HF, LA enlargement,    and/or coagulopathy)                                     2.0-3.0 (2.5-3.5)      Mitral                                                             2.5-3.5  Prosthetic/Bioprosthetic Heart Valve               2.0-3.0  Venous thromboembolism (VTE: VT, PE        2.0-3.0    Blood gas, venous [[id number]]  (Abnormal) Collected: 05/27/25 1902    Lab Status: Final result Specimen: Blood from Arm, Right Updated: 05/27/25 1913     pH, Elpidio 7.393     pCO2, Elpidio 31.8 mm Hg      pO2, Elpidio 45.4 mm Hg      HCO3, Elpidio 18.9 mmol/L      Base Excess, Elpidio -4.9 mmol/L      O2 Content, Elpidio 14.6 ml/dL      O2 HGB, VENOUS 78.4 %     B-Type Natriuretic Peptide(BNP) [[id number]]  (Abnormal) Collected: 05/27/25 1333    Lab Status: Final result Specimen: Blood from Arm, Left Updated: 05/27/25 1628      pg/mL     HS Troponin I 2hr [[id number]]  (Normal) Collected: 05/27/25 1529    Lab Status: Final result Specimen: Blood from Arm, Right Updated: 05/27/25 1605     hs TnI 2hr 19 ng/L      Delta 2hr hsTnI -1 ng/L     HS Troponin 0hr (reflex protocol) [[id number]]  (Normal) Collected: 05/27/25 1333    Lab Status: Final result Specimen: Blood from Arm, Left Updated: 05/27/25 1407     hs TnI 0hr 20 ng/L     Comprehensive metabolic panel [[id number]]  (Abnormal) Collected: 05/27/25 1333    Lab Status: Final result Specimen: Blood from Arm, Left Updated: 05/27/25 1403     Sodium 137 mmol/L      Potassium 5.1 mmol/L      Chloride 104 mmol/L      CO2 22 mmol/L      ANION GAP 11 mmol/L      BUN 9 mg/dL      Creatinine 0.66 mg/dL      Glucose 117 mg/dL      Calcium 8.6 mg/dL      AST 51 U/L      ALT 55 U/L      Alkaline Phosphatase 65 U/L      Total Protein 6.1 g/dL      Albumin 3.9 g/dL      Total Bilirubin 2.43 mg/dL      eGFR 92 ml/min/1.73sq m     Narrative:      National Kidney Disease Foundation guidelines for Chronic Kidney Disease (CKD):     Stage 1 with normal or high GFR (GFR > 90 mL/min/1.73 square meters)    Stage 2 Mild CKD (GFR = 60-89 mL/min/1.73 square meters)    Stage 3A Moderate CKD (GFR = 45-59 mL/min/1.73 square  meters)    Stage 3B Moderate CKD (GFR = 30-44 mL/min/1.73 square meters)    Stage 4 Severe CKD (GFR = 15-29 mL/min/1.73 square meters)    Stage 5 End Stage CKD (GFR <15 mL/min/1.73 square meters)  Note: GFR calculation is accurate only with a steady state creatinine    CBC and differential [[id number]]  (Abnormal) Collected: 05/27/25 1333    Lab Status: Final result Specimen: Blood from Arm, Left Updated: 05/27/25 1338     WBC 5.05 Thousand/uL      RBC 3.81 Million/uL      Hemoglobin 13.1 g/dL      Hematocrit 40.5 %       fL      MCH 34.4 pg      MCHC 32.3 g/dL      RDW 13.7 %      MPV 10.7 fL      Platelets 108 Thousands/uL      nRBC 0 /100 WBCs      Segmented % 73 %      Immature Grans % 0 %      Lymphocytes % 14 %      Monocytes % 13 %      Eosinophils Relative 0 %      Basophils Relative 0 %      Absolute Neutrophils 3.65 Thousands/µL      Absolute Immature Grans 0.01 Thousand/uL      Absolute Lymphocytes 0.70 Thousands/µL      Absolute Monocytes 0.65 Thousand/µL      Eosinophils Absolute 0.02 Thousand/µL      Basophils Absolute 0.02 Thousands/µL             XR chest 2 views   Final Interpretation by Roma Pichardo MD (05/27 1832)      Increased interstitial lung markings, suggestive of pulmonary edema.      Enlarged cardiac silhouette.            Workstation performed: [id number]         CTA chest pe study   Final Interpretation by Lovely Cline MD (05/27 0669)      No pulmonary embolism seen      Moderate right effusion with small to moderate left effusion with groundglass haziness in the lungs, correlate with CHF      Nodular groundglass density right middle lobe, nonspecific may be due to edema, follow-up suggested at 3/6 months to demonstrate resolution         Prominent subcarinal and prevascular lymph nodes, reactive      The study was marked in EPIC for immediate notification.      Computerized Assisted Algorithm (CAA) may have aided analysis of applicable images.      Workstation performed:  [id number]         VAS VENOUS DUPLEX -LOWER LIMB UNILATERAL   Final Interpretation by Peggy Yousif MD (05/27 2030)          ECG 12 Lead Documentation Only    Date/Time: 5/27/2025 2:36 PM    Performed by: Irvin Tanner DO  Authorized by: Irvin Tanner DO    ECG reviewed by me, the ED Provider: yes    Patient location:  ED  Previous ECG:     Previous ECG:  Unavailable  Interpretation:     Interpretation: abnormal    Rate:     ECG rate:  156  Rhythm:     Rhythm: atrial fibrillation    Ectopy:     Ectopy: none    QRS:     QRS axis:  Normal    QRS intervals:  Normal  Conduction:     Conduction: normal    ST segments:     ST segments:  Normal  T waves:     T waves: normal        ED Medication and Procedure Management   Prior to Admission Medications   Prescriptions Last Dose Informant Patient Reported? Taking?   escitalopram (LEXAPRO) 10 mg tablet 5/27/2025  No Yes   Sig: Take 1 tablet (10 mg total) by mouth daily   hydrOXYzine HCL (ATARAX) 25 mg tablet Not Taking  No No   Sig: Take 1 tablet (25 mg total) by mouth every 6 (six) hours as needed for anxiety   Patient not taking: Reported on 5/27/2025   multivitamin (THERAGRAN) TABS Past Month  Yes Yes   Sig: Take 1 tablet by mouth in the morning.      Facility-Administered Medications: None     Current Discharge Medication List        CONTINUE these medications which have NOT CHANGED    Details   escitalopram (LEXAPRO) 10 mg tablet Take 1 tablet (10 mg total) by mouth daily  Qty: 90 tablet, Refills: 1    Associated Diagnoses: Anxiety, generalized; Major depressive disorder, recurrent, moderate (HCC)      multivitamin (THERAGRAN) TABS Take 1 tablet by mouth in the morning.      hydrOXYzine HCL (ATARAX) 25 mg tablet Take 1 tablet (25 mg total) by mouth every 6 (six) hours as needed for anxiety  Qty: 30 tablet, Refills: 0    Associated Diagnoses: Anxiety, generalized           No discharge procedures on file.  ED SEPSIS DOCUMENTATION   Time reflects when diagnosis was  documented in both MDM as applicable and the Disposition within this note       Time User Action Codes Description Comment    2025  7:02 PM Angelina Mattson Add [J90] Pleural effusion     2025  9:11 PM Angelina Mattson Add [I48.91] A-fib ([facility])     2025  9:11 PM Angelina Mattson Add [I50.9] CHF (congestive heart failure) ([facility])     2025  6:00 AM Mirlande Moran Modify [J90] Pleural effusion     2025  6:00 AM Mirlande Moran Add [F10.90] Alcohol use disorder                      [1] No past medical history on file.  [2]   Past Surgical History:  Procedure Laterality Date    WISDOM TOOTH EXTRACTION     [3]   Family History  Problem Relation Name Age of Onset    Dementia Mother Jackie     Depression Mother Jackie     Hypotension Mother Jackie     Coronary artery disease Father Stuart     Cancer Father Stuart         mouth cancer    No Known Problems Sister Beryl     Rheum arthritis Brother Stuart Saunders     Anxiety disorder Daughter Marlin     Hypertension Sister Eli     No Known Problems Daughter Doris    [4]   Social History  Tobacco Use    Smoking status: Former     Current packs/day: 0.00     Average packs/day: 0.5 packs/day for 15.0 years (7.5 ttl pk-yrs)     Types: Cigarettes     Start date: 1980     Quit date: 1995     Years since quittin.4    Smokeless tobacco: Never   Vaping Use    Vaping status: Never Used   Substance Use Topics    Alcohol use: Yes     Alcohol/week: 3.0 standard drinks of alcohol     Types: 3 Glasses of wine per week    Drug use: Never        Irvin Tanner DO  [id number]

## 2025-05-27 NOTE — ED NOTES
Per provider, do not titrate cardizem drip at this time. Current rate at 12.5 ml/hr.     Jennifer Medina RN  05/27/25 0740

## 2025-05-27 NOTE — ED ATTENDING ATTESTATION
5/27/2025  I, Angelita Beckham MD, saw and evaluated the patient. I have discussed the patient with the resident/non-physician practitioner and agree with the resident's/non-physician practitioner's findings, Plan of Care, and MDM as documented in the resident's/non-physician practitioner's note, except where noted. All available labs and Radiology studies were reviewed.  I was present for key portions of any procedure(s) performed by the resident/non-physician practitioner and I was immediately available to provide assistance.       At this point I agree with the current assessment done in the Emergency Department.  I have conducted an independent evaluation of this patient a history and physical is as follows:    64-year-old female presenting from urgent care via EMS in [facility] with RVR.  Patient went to urgent care due to 3 days of feeling unwell with cough, shortness of breath, and generalized weakness.  Patient states that she was treated for bronchitis back in April, initially felt better after treatment with antibiotics but 3 days ago symptoms returned.  She denies chest discomfort.  Shortness of breath is present at rest.  Accompanied by bilateral lower extremity edema.  Patient denies history of thromboembolism.    On arrival, patient is notably anxious appearing, diaphoretic, with faint tremor noted.  She admits to daily heavy alcohol use, last drink 3 days ago.  Has not drank over the last 3 days due to feeling generally unwell.    Patient was given less than 50 mg of IV diltiazem prehospital that was stopped prior to entire dose administration due to hypotension with blood pressures in the 80s systolic.    Physical Exam  Vitals and nursing note reviewed.   Constitutional:       General: She is not in acute distress.     Appearance: She is well-developed. She is ill-appearing (tachycardic, diaphoretic, anxious) and diaphoretic. She is not toxic-appearing.   HENT:      Head: Normocephalic and atraumatic.       Right Ear: External ear normal.      Left Ear: External ear normal.      Nose: Nose normal.     Eyes:      Extraocular Movements: Extraocular movements intact.      Conjunctiva/sclera: Conjunctivae normal.      Pupils: Pupils are equal, round, and reactive to light.       Cardiovascular:      Rate and Rhythm: Tachycardia present. Rhythm irregular.      Pulses: Normal pulses.      Heart sounds: Normal heart sounds. No murmur heard.     No friction rub. No gallop.   Pulmonary:      Effort: Respiratory distress (tachypneic) present.      Breath sounds: Normal breath sounds. No wheezing or rales.      Comments: Tachypnea noted with bronchospastic-sounding cough. No conversational dyspnea. Lungs themselves are clear to auscultation.   Abdominal:      General: Bowel sounds are normal. There is no distension.      Palpations: Abdomen is soft.      Tenderness: There is no abdominal tenderness. There is no guarding.     Musculoskeletal:         General: No deformity. Normal range of motion.      Cervical back: Normal range of motion and neck supple.      Comments: 1+ edema to the LLE, trace edema to the RLE, no calf tenderness     Skin:     General: Skin is warm.     Neurological:      Mental Status: She is alert and oriented to person, place, and time.      Motor: No abnormal muscle tone.      Comments: Tremor noted to the bilateral hands.    Psychiatric:         Mood and Affect: Mood normal.           ED Course  ED Course as of 05/27/25 1852 Tue May 27, 2025   1336 I personally interpreted the patient's EKG which reveals A-fib with RVR with heart rate of 156 bpm, normal axis, QTc of 489 ms, no ST segment deviation. Patient received diltiazem bolus prehospital.    1413 Patient is in alcohol withdrawal- tremulous, anxious, sweaty, tachycardic. At baseline drinks 1 box of wine every couple of days. Last drink 3 days ago. Has not been drinking due to feeling unwell from her cough and shortness of breath.  She is notably  hypoxic with SpO2 of 91% on arrival, no underlying history of lung disease.  Will workup for causes of hypoxia including pneumonia, PE, CHF, and bronchitis but suspect that alcohol withdrawal is playing a role in her A-fib with RVR.  Will load with 10 mg/kg phenobarbital.    1418 CIWA 15 on arrival.    1426 Patient has been taking OTC Bronkaid, which contains ephedrine- may be contributing to her tachycardia afib/w RVR. Denies taking more than what is recommended on the container.    1509 LLE duplex without evidence of DVT. Patient currently in CT scan.    1557 Patient was given loading dose of phenobarbital, tolerated it well.  Heart rate still in the 160s so she was given diltiazem bolus and started on diltiazem infusion with improvement in heart rate currently to 117 bpm.   1558 CTA of the chest without evidence of PE.  Patient has a moderate right and small to moderate left pleural effusions with groundglass haziness in the lungs, likely consistent with new onset CHF.  Discussed with her recommendation for follow-up CT scan of her chest in 3 to 6 months to ensure resolution of a nodular groundglass density in the right middle lobe which is nonspecific but per radiology may be due to edema.   1559 Patient now with increased WOB. Crackles at the bilateral bases. Wheezing thought to be 2/2 pulmonary edema rather than bronchospasm. Will initiate diuresis- IV lasix ordered.    1619 Case discussed with Dr. Zhao with cardiology- agrees with discontinuing diltiazem due to the new-onset CHF. Recommends starting metoprolol 2.5 mg IV q6 hours, holding for BP<90 bpm. Recommends starting a heparin drip for anticoagulation. Respiratory therapy is on the way to start bipap.    1645    1646 Patient is tolerating bipap well at 16/4.    1658 Patient looks markedly improved on BiPAP.  Heart rate 112, breathing comfortably.  SpO2 in the mid to high 90s.   1827 Patient trialed off of BiPAP, had pretty immediate return and  respiratory rate back into the 30s with wheezing.  Will place back on BiPAP.  I suspect that the patient's wheezing is in the setting of new onset decompensated heart failure rather than bronchoconstriction so we will hold off on bronchodilators due to concern for exacerbating her A-fib with RVR.  Will reach out to critical care for admission.   1848 Patient evaluated by critical care- accepted to SD[facility].      Critical Care Time Statement: Upon my evaluation, this patient had a high probability of imminent or life-threatening deterioration due to new onset CHF requiring bipap, which required my direct attention, intervention, and personal management.  I spent a total of 45 minutes directly providing critical care services, including interpretation of complex medical databases, evaluating for the presence of life-threatening injuries or illnesses, management of organ system failure(s) , complex medical decision making (to support/prevent further life-threatening deterioration)., and interpretation of hemodynamic data. This time is exclusive of procedures, teaching, treating other patients, family meetings, and any prior time recorded by providers other than myself.        Critical Care Time  Procedures

## 2025-05-27 NOTE — ED PROCEDURE NOTE
Procedure  POC Cardiac US    Date/Time: 5/27/2025 2:21 PM    Performed by: Jacob Nichols MD  Authorized by: Jacob Nichols MD    Patient location:  ED  Procedure details:     Exam Type:  Transthoracic Echocardiography (TTE), limited    Purpose of Exam: diagnostic    Indications: chest pain      Assessment / Evaluation for: cardiac function, pericardial effusion and right heart strain (suspected pulmonary embolism)      Exam Type: initial exam      Image quality: diagnostic      Image availability:  Images available in PACS  Patient Details:     Cardiac Rhythm:  Regular    Mechanical ventilation: No    Cardiac findings:     Echo modes evaluated: limited 2D      Views obtained: parasternal long axis, parasternal short axis, subcostal and apical 4-chamber      Pericardial effusion: absent      Tamponade physiology: absent      Wall motion: normal      LV systolic function: normal      RV dilation: none    Pulmonary findings:     Left Lung Findings: left lung sliding      Right lung findings: right lung sliding      B-lines: no B-lines present    POC Lung US    Date/Time: 5/27/2025 2:31 PM    Performed by: Jacob Nichols MD  Authorized by: Jacob Nichols MD    Patient location:  ED  Procedure details:     Exam Type:  Diagnostic    Indications: chest pain      Assessment / Evaluation for:  Pneumothorax, pleural effusion and hemothorax    Structures Visualized: pleural line, rib, diaphragm, left hemithorax and right hemithorax      Exam Type: initial exam      Image quality: diagnostic      Image availability:  Images available in PACS  Left Hemithorax Findings:     Left pleura visualized:  Visualized    Left Hemithorax Findings: normal      Left lung findings: normal interstitium    Right Lung Findings:     Right pleural visualized:  Visualized    Right hemithorax findings: normal      Right lung findings: normal interstitium    Interpretation:     Findings: normal thoracic ultrasound                     Jacob  MD Simone  05/27/25 0960

## 2025-05-28 ENCOUNTER — APPOINTMENT (INPATIENT)
Dept: NON INVASIVE DIAGNOSTICS | Facility: HOSPITAL | Age: 65
DRG: 291 | End: 2025-05-28
Payer: MEDICARE

## 2025-05-28 ENCOUNTER — APPOINTMENT (INPATIENT)
Dept: RADIOLOGY | Facility: HOSPITAL | Age: 65
DRG: 291 | End: 2025-05-28
Payer: MEDICARE

## 2025-05-28 ENCOUNTER — APPOINTMENT (INPATIENT)
Dept: ULTRASOUND IMAGING | Facility: HOSPITAL | Age: 65
DRG: 291 | End: 2025-05-28
Payer: MEDICARE

## 2025-05-28 PROBLEM — F32.A ANXIETY AND DEPRESSION: Status: ACTIVE | Noted: 2020-04-27

## 2025-05-28 PROBLEM — E83.42 HYPOMAGNESEMIA: Status: ACTIVE | Noted: 2025-05-28

## 2025-05-28 PROBLEM — E87.6 HYPOKALEMIA: Status: ACTIVE | Noted: 2025-05-28

## 2025-05-28 PROBLEM — F41.9 ANXIETY AND DEPRESSION: Status: ACTIVE | Noted: 2020-04-27

## 2025-05-28 PROBLEM — F33.1 MAJOR DEPRESSIVE DISORDER, RECURRENT, MODERATE (HCC): Status: RESOLVED | Noted: 2020-04-27 | Resolved: 2025-05-28

## 2025-05-28 PROBLEM — J96.01 ACUTE HYPOXIC RESPIRATORY FAILURE (HCC): Status: ACTIVE | Noted: 2025-05-28

## 2025-05-28 LAB
ALBUMIN SERPL BCG-MCNC: 3.4 G/DL (ref 3.5–5)
ALP SERPL-CCNC: 61 U/L (ref 34–104)
ALT SERPL W P-5'-P-CCNC: 43 U/L (ref 7–52)
ANION GAP SERPL CALCULATED.3IONS-SCNC: 12 MMOL/L (ref 4–13)
ANION GAP SERPL CALCULATED.3IONS-SCNC: 12 MMOL/L (ref 4–13)
APTT PPP: 77 SECONDS (ref 23–34)
APTT PPP: 86 SECONDS (ref 23–34)
AST SERPL W P-5'-P-CCNC: 33 U/L (ref 13–39)
BASOPHILS # BLD AUTO: 0.03 THOUSANDS/ÂΜL (ref 0–0.1)
BASOPHILS NFR BLD AUTO: 1 % (ref 0–1)
BILIRUB SERPL-MCNC: 1.78 MG/DL (ref 0.2–1)
BUN SERPL-MCNC: 10 MG/DL (ref 5–25)
BUN SERPL-MCNC: 9 MG/DL (ref 5–25)
CALCIUM ALBUM COR SERPL-MCNC: 8.3 MG/DL (ref 8.3–10.1)
CALCIUM SERPL-MCNC: 7.8 MG/DL (ref 8.4–10.2)
CALCIUM SERPL-MCNC: 8.5 MG/DL (ref 8.4–10.2)
CHLORIDE SERPL-SCNC: 103 MMOL/L (ref 96–108)
CHLORIDE SERPL-SCNC: 106 MMOL/L (ref 96–108)
CO2 SERPL-SCNC: 21 MMOL/L (ref 21–32)
CO2 SERPL-SCNC: 23 MMOL/L (ref 21–32)
CREAT SERPL-MCNC: 0.53 MG/DL (ref 0.6–1.3)
CREAT SERPL-MCNC: 0.54 MG/DL (ref 0.6–1.3)
EOSINOPHIL # BLD AUTO: 0.18 THOUSAND/ÂΜL (ref 0–0.61)
EOSINOPHIL NFR BLD AUTO: 4 % (ref 0–6)
ERYTHROCYTE [DISTWIDTH] IN BLOOD BY AUTOMATED COUNT: 13.7 % (ref 11.6–15.1)
GFR SERPL CREATININE-BSD FRML MDRD: 99 ML/MIN/1.73SQ M
GFR SERPL CREATININE-BSD FRML MDRD: 99 ML/MIN/1.73SQ M
GLUCOSE SERPL-MCNC: 71 MG/DL (ref 65–140)
GLUCOSE SERPL-MCNC: 75 MG/DL (ref 65–140)
HCT VFR BLD AUTO: 37.7 % (ref 34.8–46.1)
HGB BLD-MCNC: 11.8 G/DL (ref 11.5–15.4)
IMM GRANULOCYTES # BLD AUTO: 0.02 THOUSAND/UL (ref 0–0.2)
IMM GRANULOCYTES NFR BLD AUTO: 1 % (ref 0–2)
LYMPHOCYTES # BLD AUTO: 0.95 THOUSANDS/ÂΜL (ref 0.6–4.47)
LYMPHOCYTES NFR BLD AUTO: 23 % (ref 14–44)
MAGNESIUM SERPL-MCNC: 2 MG/DL (ref 1.9–2.7)
MCH RBC QN AUTO: 34.3 PG (ref 26.8–34.3)
MCHC RBC AUTO-ENTMCNC: 31.3 G/DL (ref 31.4–37.4)
MCV RBC AUTO: 110 FL (ref 82–98)
MONOCYTES # BLD AUTO: 0.46 THOUSAND/ÂΜL (ref 0.17–1.22)
MONOCYTES NFR BLD AUTO: 11 % (ref 4–12)
NEUTROPHILS # BLD AUTO: 2.43 THOUSANDS/ÂΜL (ref 1.85–7.62)
NEUTS SEG NFR BLD AUTO: 60 % (ref 43–75)
NRBC BLD AUTO-RTO: 0 /100 WBCS
PHOSPHATE SERPL-MCNC: 3.7 MG/DL (ref 2.3–4.1)
PLATELET # BLD AUTO: 102 THOUSANDS/UL (ref 149–390)
PMV BLD AUTO: 10.7 FL (ref 8.9–12.7)
POTASSIUM SERPL-SCNC: 3.3 MMOL/L (ref 3.5–5.3)
POTASSIUM SERPL-SCNC: 5.1 MMOL/L (ref 3.5–5.3)
PROT SERPL-MCNC: 5.3 G/DL (ref 6.4–8.4)
RBC # BLD AUTO: 3.44 MILLION/UL (ref 3.81–5.12)
SODIUM SERPL-SCNC: 138 MMOL/L (ref 135–147)
SODIUM SERPL-SCNC: 139 MMOL/L (ref 135–147)
TSH SERPL DL<=0.05 MIU/L-ACNC: 1.34 UIU/ML (ref 0.45–4.5)
WBC # BLD AUTO: 4.07 THOUSAND/UL (ref 4.31–10.16)

## 2025-05-28 PROCEDURE — 76705 ECHO EXAM OF ABDOMEN: CPT

## 2025-05-28 PROCEDURE — 99223 1ST HOSP IP/OBS HIGH 75: CPT | Performed by: INTERNAL MEDICINE

## 2025-05-28 PROCEDURE — 93306 TTE W/DOPPLER COMPLETE: CPT

## 2025-05-28 PROCEDURE — 71045 X-RAY EXAM CHEST 1 VIEW: CPT

## 2025-05-28 PROCEDURE — 80053 COMPREHEN METABOLIC PANEL: CPT

## 2025-05-28 PROCEDURE — 84100 ASSAY OF PHOSPHORUS: CPT

## 2025-05-28 PROCEDURE — 85025 COMPLETE CBC W/AUTO DIFF WBC: CPT

## 2025-05-28 PROCEDURE — 85730 THROMBOPLASTIN TIME PARTIAL: CPT | Performed by: INTERNAL MEDICINE

## 2025-05-28 PROCEDURE — 94660 CPAP INITIATION&MGMT: CPT

## 2025-05-28 PROCEDURE — 84443 ASSAY THYROID STIM HORMONE: CPT

## 2025-05-28 PROCEDURE — 94760 N-INVAS EAR/PLS OXIMETRY 1: CPT

## 2025-05-28 PROCEDURE — 93306 TTE W/DOPPLER COMPLETE: CPT | Performed by: STUDENT IN AN ORGANIZED HEALTH CARE EDUCATION/TRAINING PROGRAM

## 2025-05-28 PROCEDURE — 99232 SBSQ HOSP IP/OBS MODERATE 35: CPT | Performed by: INTERNAL MEDICINE

## 2025-05-28 PROCEDURE — 83735 ASSAY OF MAGNESIUM: CPT

## 2025-05-28 PROCEDURE — 80048 BASIC METABOLIC PNL TOTAL CA: CPT

## 2025-05-28 RX ORDER — DIGOXIN 0.25 MG/ML
250 INJECTION INTRAMUSCULAR; INTRAVENOUS ONCE
Status: COMPLETED | OUTPATIENT
Start: 2025-05-28 | End: 2025-05-28

## 2025-05-28 RX ORDER — FUROSEMIDE 10 MG/ML
40 INJECTION INTRAMUSCULAR; INTRAVENOUS DAILY
Status: DISCONTINUED | OUTPATIENT
Start: 2025-05-28 | End: 2025-05-29

## 2025-05-28 RX ORDER — METOPROLOL TARTRATE 1 MG/ML
5 INJECTION, SOLUTION INTRAVENOUS ONCE
Status: COMPLETED | OUTPATIENT
Start: 2025-05-28 | End: 2025-05-28

## 2025-05-28 RX ORDER — POTASSIUM CHLORIDE 14.9 MG/ML
20 INJECTION INTRAVENOUS ONCE
Status: COMPLETED | OUTPATIENT
Start: 2025-05-28 | End: 2025-05-28

## 2025-05-28 RX ORDER — DIGOXIN 0.25 MG/ML
250 INJECTION INTRAMUSCULAR; INTRAVENOUS EVERY 8 HOURS
Status: COMPLETED | OUTPATIENT
Start: 2025-05-28 | End: 2025-05-29

## 2025-05-28 RX ORDER — CALCIUM GLUCONATE 20 MG/ML
1 INJECTION, SOLUTION INTRAVENOUS ONCE
Status: COMPLETED | OUTPATIENT
Start: 2025-05-28 | End: 2025-05-28

## 2025-05-28 RX ORDER — POTASSIUM CHLORIDE 14.9 MG/ML
20 INJECTION INTRAVENOUS
Status: COMPLETED | OUTPATIENT
Start: 2025-05-28 | End: 2025-05-28

## 2025-05-28 RX ORDER — DIGOXIN 0.25 MG/ML
125 INJECTION INTRAMUSCULAR; INTRAVENOUS EVERY 6 HOURS
Status: DISCONTINUED | OUTPATIENT
Start: 2025-05-28 | End: 2025-05-28

## 2025-05-28 RX ORDER — METOPROLOL TARTRATE 25 MG/1
25 TABLET, FILM COATED ORAL EVERY 12 HOURS SCHEDULED
Status: DISCONTINUED | OUTPATIENT
Start: 2025-05-28 | End: 2025-05-29

## 2025-05-28 RX ORDER — FUROSEMIDE 10 MG/ML
40 INJECTION INTRAMUSCULAR; INTRAVENOUS ONCE
Status: COMPLETED | OUTPATIENT
Start: 2025-05-28 | End: 2025-05-28

## 2025-05-28 RX ORDER — DIGOXIN 0.25 MG/ML
250 INJECTION INTRAMUSCULAR; INTRAVENOUS EVERY 6 HOURS
Status: DISCONTINUED | OUTPATIENT
Start: 2025-05-28 | End: 2025-05-28

## 2025-05-28 RX ORDER — FOLIC ACID 0.4 MG
400 TABLET ORAL DAILY
Status: DISCONTINUED | OUTPATIENT
Start: 2025-05-28 | End: 2025-05-31 | Stop reason: HOSPADM

## 2025-05-28 RX ORDER — FUROSEMIDE 10 MG/ML
40 INJECTION INTRAMUSCULAR; INTRAVENOUS DAILY
Status: CANCELLED | OUTPATIENT
Start: 2025-05-28

## 2025-05-28 RX ORDER — POTASSIUM CHLORIDE 14.9 MG/ML
20 INJECTION INTRAVENOUS ONCE
Status: DISCONTINUED | OUTPATIENT
Start: 2025-05-28 | End: 2025-05-28

## 2025-05-28 RX ORDER — FAMOTIDINE 10 MG/ML
20 INJECTION, SOLUTION INTRAVENOUS DAILY
Status: DISCONTINUED | OUTPATIENT
Start: 2025-05-28 | End: 2025-05-28

## 2025-05-28 RX ORDER — FUROSEMIDE 10 MG/ML
40 INJECTION INTRAMUSCULAR; INTRAVENOUS
Status: DISCONTINUED | OUTPATIENT
Start: 2025-05-28 | End: 2025-05-28

## 2025-05-28 RX ORDER — FUROSEMIDE 10 MG/ML
40 INJECTION INTRAMUSCULAR; INTRAVENOUS
Status: CANCELLED | OUTPATIENT
Start: 2025-05-28

## 2025-05-28 RX ORDER — METOPROLOL TARTRATE 1 MG/ML
2.5 INJECTION, SOLUTION INTRAVENOUS ONCE
Status: COMPLETED | OUTPATIENT
Start: 2025-05-28 | End: 2025-05-28

## 2025-05-28 RX ADMIN — METOROPROLOL TARTRATE 5 MG: 5 INJECTION, SOLUTION INTRAVENOUS at 06:30

## 2025-05-28 RX ADMIN — DIGOXIN 250 MCG: 250 INJECTION, SOLUTION INTRAMUSCULAR; INTRAVENOUS; PARENTERAL at 06:27

## 2025-05-28 RX ADMIN — HEPARIN SODIUM 12 UNITS/KG/HR: 10000 INJECTION, SOLUTION INTRAVENOUS at 21:49

## 2025-05-28 RX ADMIN — METOPROLOL TARTRATE 2.5 MG: 1 INJECTION, SOLUTION INTRAVENOUS at 22:35

## 2025-05-28 RX ADMIN — FOLIC ACID 1 MG: 5 INJECTION, SOLUTION INTRAMUSCULAR; INTRAVENOUS; SUBCUTANEOUS at 08:48

## 2025-05-28 RX ADMIN — METOROPROLOL TARTRATE 5 MG: 5 INJECTION, SOLUTION INTRAVENOUS at 12:20

## 2025-05-28 RX ADMIN — FAMOTIDINE 20 MG: 10 INJECTION INTRAVENOUS at 10:04

## 2025-05-28 RX ADMIN — CALCIUM GLUCONATE 1 G: 20 INJECTION, SOLUTION INTRAVENOUS at 06:54

## 2025-05-28 RX ADMIN — METOPROLOL TARTRATE 5 MG: 1 INJECTION, SOLUTION INTRAVENOUS at 07:40

## 2025-05-28 RX ADMIN — DIGOXIN 125 MCG: 250 INJECTION, SOLUTION INTRAMUSCULAR; INTRAVENOUS; PARENTERAL at 12:21

## 2025-05-28 RX ADMIN — POTASSIUM CHLORIDE 20 MEQ: 14.9 INJECTION, SOLUTION INTRAVENOUS at 08:09

## 2025-05-28 RX ADMIN — POTASSIUM CHLORIDE 20 MEQ: 14.9 INJECTION, SOLUTION INTRAVENOUS at 10:09

## 2025-05-28 RX ADMIN — DIGOXIN 250 MCG: 250 INJECTION, SOLUTION INTRAMUSCULAR; INTRAVENOUS; PARENTERAL at 17:51

## 2025-05-28 RX ADMIN — POTASSIUM CHLORIDE 20 MEQ: 14.9 INJECTION, SOLUTION INTRAVENOUS at 05:34

## 2025-05-28 RX ADMIN — FOLIC ACID TAB 400 MCG 400 MCG: 400 TAB at 16:06

## 2025-05-28 RX ADMIN — THIAMINE HYDROCHLORIDE 500 MG: 100 INJECTION, SOLUTION INTRAMUSCULAR; INTRAVENOUS at 09:48

## 2025-05-28 RX ADMIN — METOPROLOL TARTRATE 5 MG: 1 INJECTION, SOLUTION INTRAVENOUS at 02:04

## 2025-05-28 RX ADMIN — METOPROLOL TARTRATE 25 MG: 25 TABLET, FILM COATED ORAL at 17:48

## 2025-05-28 RX ADMIN — FUROSEMIDE 40 MG: 10 INJECTION, SOLUTION INTRAVENOUS at 05:09

## 2025-05-28 NOTE — ASSESSMENT & PLAN NOTE
- new onset for patient  - rates 160s - 110s  - likely cause of CHF  - given metoprolol in ED and Cardizem by EMS    Plan:  - consider cardiology consult  - consider ammio vs digoxin for rate control in setting of CHF

## 2025-05-28 NOTE — ASSESSMENT & PLAN NOTE
Wt Readings from Last 3 Encounters:   05/27/25 75 kg (165 lb 5.5 oz)   06/08/20 67.1 kg (148 lb)   05/11/20 67.1 kg (148 lb)     - new onset  - pulmonary edema and pleural effusions on CT  - requiring BiPAP in ED   - attempt trial off that was unsuccessful  - 40 mg lasix IV given in ED (lasix naive)  - elevated BNP  - likely 2/2 a-fib RVR    Plan:  - monitor I/Os  - consider additional diuretics   - continue BiPAP, consider trial off in AM

## 2025-05-28 NOTE — QUICK NOTE
Critical Care Interval Transfer Note:    Brief Hospital Summary: 64 y.o. alcohol use disorder (last drink 3 days ago, daily use)who presented to [facility] ED with shortness of breath and new onset A-fib with RVR (no known cardiac history). She was noting 3 day h/o shortness of breath, cough and new onset lower extremity edema. She was noted to have volume overload per CXR. CTPE was negative for PE but showed moderate pleural effusions R>L She was given cardizem for Afib w RVR and metoprolol as well, she is now on digoxin and metoprolol, cardiology is following. Admitted to [facility] for inability to wean bipap. She was weaned off bipap as of this afternoon, is now on 2L NC.   On CIWA    Barriers to discharge:   Hypoxia, new O2 requirement  F/u echo  Pt/ot  New onset afib with RVR  Rate control w metoprolol and digoxin  May need anticoagulation as outpatient  Cardiology consulted  Check AM dig level     Consults: IP CONSULT TO CASE MANAGEMENT  IP CONSULT TO CARDIOLOGY  IP CONSULT TO ALCOHOL BRIEF INTERVENTION TRAUMA    Recommended to review admission imaging for incidental findings and document in discharge navigator: Incidental findings have been documented in discharge navigator. Patient and/or family was informed and they expressed understanding.      Discharge Plan: Anticipate discharge in 24-48 hrs to discharge location to be determined pending rehab evaluations.       Patient seen and evaluated by Critical Care today and deemed to be appropriate for transfer to Med Surg with Telemetry. Spoke to Dr. David from [facility] to accept transfer. Critical care can be contacted via SecureChat with any questions or concerns. Please use the Critical Care AP Role in Secure Chat for any provider inquires until the patient is transferred out of the ICU or until tomorrow at 0600.

## 2025-05-28 NOTE — ASSESSMENT & PLAN NOTE
Required BIPAP and ICU admission  Secondary to CHF exacerbation  Currently on 4 L NC  Wean off supplemental oxygen as tolerated

## 2025-05-28 NOTE — PLAN OF CARE
Problem: PAIN - ADULT  Goal: Verbalizes/displays adequate comfort level or baseline comfort level  Description: Interventions:  - Encourage patient to monitor pain and request assistance  - Assess pain using appropriate pain scale  - Administer analgesics as ordered based on type and severity of pain and evaluate response  - Implement non-pharmacological measures as appropriate and evaluate response  - Consider cultural and social influences on pain and pain management  - Notify physician/advanced practitioner if interventions unsuccessful or patient reports new pain  - Educate patient/family on pain management process including their role and importance of  reporting pain   - Provide non-pharmacologic/complimentary pain relief interventions  Outcome: Progressing     Problem: INFECTION - ADULT  Goal: Absence or prevention of progression during hospitalization  Description: INTERVENTIONS:  - Assess and monitor for signs and symptoms of infection  - Monitor lab/diagnostic results  - Monitor all insertion sites, i.e. indwelling lines, tubes, and drains  - Monitor endotracheal if appropriate and nasal secretions for changes in amount and color  - [place] appropriate cooling/warming therapies per order  - Administer medications as ordered  - Instruct and encourage patient and family to use good hand hygiene technique  - Identify and instruct in appropriate isolation precautions for identified infection/condition  Outcome: Progressing  Goal: Absence of fever/infection during neutropenic period  Description: INTERVENTIONS:  - Monitor WBC  - Perform strict hand hygiene  - Limit to healthy visitors only  - No plants, dried, fresh or silk flowers with alan in patient room  Outcome: Progressing     Problem: SAFETY ADULT  Goal: Patient will remain free of falls  Description: INTERVENTIONS:  - Educate patient/family on patient safety including physical limitations  - Instruct patient to call for assistance with activity   -  Consider consulting OT/PT to assist with strengthening/mobility based on AM PAC & JH-HLM score  - Consult OT/PT to assist with strengthening/mobility   - Keep Call bell within reach  - Keep bed low and locked with side rails adjusted as appropriate  - Keep care items and personal belongings within reach  - Initiate and maintain comfort rounds  - Make Fall Risk Sign visible to staff  - Offer Toileting every  Hours, in advance of need  - Initiate/Maintain alarm  - Obtain necessary fall risk management equipment:   - Apply yellow socks and bracelet for high fall risk patients  - Consider moving patient to room near nurses station  Outcome: Progressing  Goal: Maintain or return to baseline ADL function  Description: INTERVENTIONS:  -  Assess patient's ability to carry out ADLs; assess patient's baseline for ADL function and identify physical deficits which impact ability to perform ADLs (bathing, care of mouth/teeth, toileting, grooming, dressing, etc.)  - Assess/evaluate cause of self-care deficits   - Assess range of motion  - Assess patient's mobility; develop plan if impaired  - Assess patient's need for assistive devices and provide as appropriate  - Encourage maximum independence but intervene and supervise when necessary  - Involve family in performance of ADLs  - Assess for home care needs following discharge   - Consider OT consult to assist with ADL evaluation and planning for discharge  - Provide patient education as appropriate  - Monitor functional capacity and physical performance, use of AM PAC & JH-HLM   - Monitor gait, balance and fatigue with ambulation    Outcome: Progressing  Goal: Maintains/Returns to pre admission functional level  Description: INTERVENTIONS:  - Perform AM-PAC 6 Click Basic Mobility/ Daily Activity assessment daily.  - Set and communicate daily mobility goal to care team and patient/family/caregiver.   - Collaborate with rehabilitation services on mobility goals if consulted  -  Perform Range of Motion  times a day.  - Reposition patient every  hours.  - Dangle patient  times a day  - Stand patient  times a day  - Ambulate patient times a day  - Out of bed to chair  times a day   - Out of bed for meals  times a day  - Out of bed for toileting  - Record patient progress and toleration of activity level   Outcome: Progressing     Problem: DISCHARGE PLANNING  Goal: Discharge to home or other facility with appropriate resources  Description: INTERVENTIONS:  - Identify barriers to discharge w/patient and caregiver  - Arrange for needed discharge resources and transportation as appropriate  - Identify discharge learning needs (meds, wound care, etc.)  - Arrange for interpretive services to assist at discharge as needed  - Refer to Case Management Department for coordinating discharge planning if the patient needs post-hospital services based on physician/advanced practitioner order or complex needs related to functional status, cognitive ability, or social support system  Outcome: Progressing     Problem: Knowledge Deficit  Goal: Patient/family/caregiver demonstrates understanding of disease process, treatment plan, medications, and discharge instructions  Description: Complete learning assessment and assess knowledge base.  Interventions:  - Provide teaching at level of understanding  - Provide teaching via preferred learning methods  Outcome: Progressing     Problem: Prexisting or High Potential for Compromised Skin Integrity  Goal: Skin integrity is maintained or improved  Description: INTERVENTIONS:  - Identify patients at risk for skin breakdown  - Assess and monitor skin integrity including under and around medical devices   - Assess and monitor nutrition and hydration status  - Monitor labs  - Assess for incontinence   - Turn and reposition patient  - Assist with mobility/ambulation  - Relieve pressure over eran prominences   - Avoid friction and shearing  - Provide appropriate hygiene as  needed including keeping skin clean and dry  - Evaluate need for skin moisturizer/barrier cream  - Collaborate with interdisciplinary team  - Patient/family teaching  - Consider wound care consult    Assess:  - Review Kev scale daily  - Clean and moisturize skin every   - Inspect skin when repositioning, toileting, and assisting with ADLS  - Assess under medical devices such as  every   - Assess extremities for adequate circulation and sensation     Bed Management:  - Have minimal linens on bed & keep smooth, unwrinkled  - Change linens as needed when moist or perspiring  - Avoid sitting or lying in one position for more than  hours while in bed?Keep HOB at degrees   - Toileting:  - Offer bedside commode  - Assess for incontinence every   - Use incontinent care products after each incontinent episode such as     Activity:  - Mobilize patient  times a day  - Encourage activity and walks on unit  - Encourage or provide ROM exercises   - Turn and reposition patient every  Hours  - Use appropriate equipment to lift or move patient in bed  - Instruct/ Assist with weight shifting every  when out of bed in chair  - Consider limitation of chair time  hour intervals    Skin Care:  - Avoid use of baby powder, tape, friction and shearing, hot water or constrictive clothing  - Relieve pressure over bony prominences using   - Do not massage red bony areas    Next Steps:  - Teach patient strategies to minimize risks such as  - Consider consults to  interdisciplinary teams such as   Outcome: Progressing     Problem: Nutrition/Hydration-ADULT  Goal: Nutrient/Hydration intake appropriate for improving, restoring or maintaining nutritional needs  Description: Monitor and assess patient's nutrition/hydration status for malnutrition. Collaborate with interdisciplinary team and initiate plan and interventions as ordered.  Monitor patient's weight and dietary intake as ordered or per policy. Utilize nutrition screening tool and  intervene as necessary. Determine patient's food preferences and provide high-protein, high-caloric foods as appropriate.     INTERVENTIONS:  - Monitor oral intake, urinary output, labs, and treatment plans  - Assess nutrition and hydration status and recommend course of action  - Evaluate amount of meals eaten  - Assist patient with eating if necessary   - Allow adequate time for meals  - Recommend/ encourage appropriate diets, oral nutritional supplements, and vitamin/mineral supplements  - Order, calculate, and assess calorie counts as needed  - Recommend, monitor, and adjust tube feedings and TPN/PPN based on assessed needs  - Assess need for intravenous fluids  - Provide specific nutrition/hydration education as appropriate  - Include patient/family/caregiver in decisions related to nutrition  Outcome: Progressing     Problem: CARDIOVASCULAR - ADULT  Goal: Maintains optimal cardiac output and hemodynamic stability  Description: INTERVENTIONS:  - Monitor I/O, vital signs and rhythm  - Monitor for S/S and trends of decreased cardiac output  - Administer and titrate ordered vasoactive medications to optimize hemodynamic stability  - Assess quality of pulses, skin color and temperature  - Assess for signs of decreased coronary artery perfusion  - Instruct patient to report change in severity of symptoms  Outcome: Progressing  Goal: Absence of cardiac dysrhythmias or at baseline rhythm  Description: INTERVENTIONS:  - Continuous cardiac monitoring, vital signs, obtain 12 lead EKG if ordered  - Administer antiarrhythmic and heart rate control medications as ordered  - Monitor electrolytes and administer replacement therapy as ordered  Outcome: Progressing     Problem: RESPIRATORY - ADULT  Goal: Achieves optimal ventilation and oxygenation  Description: INTERVENTIONS:  - Assess for changes in respiratory status  - Assess for changes in mentation and behavior  - Position to facilitate oxygenation and minimize  respiratory effort  - Oxygen administered by appropriate delivery if ordered  - Initiate smoking cessation education as indicated  - Encourage broncho-pulmonary hygiene including cough, deep breathe, Incentive Spirometry  - Assess the need for suctioning and aspirate as needed  - Assess and instruct to report SOB or any respiratory difficulty  - Respiratory Therapy support as indicated  Outcome: Progressing     Problem: METABOLIC, FLUID AND ELECTROLYTES - ADULT  Goal: Electrolytes maintained within normal limits  Description: INTERVENTIONS:  - Monitor labs and assess patient for signs and symptoms of electrolyte imbalances  - Administer electrolyte replacement as ordered  - Monitor response to electrolyte replacements, including repeat lab results as appropriate  - Instruct patient on fluid and nutrition as appropriate  Outcome: Progressing  Goal: Fluid balance maintained  Description: INTERVENTIONS:  - Monitor labs   - Monitor I/O and WT  - Instruct patient on fluid and nutrition as appropriate  - Assess for signs & symptoms of volume excess or deficit  Outcome: Progressing

## 2025-05-28 NOTE — QUICK NOTE
1700: Patient evaluated at bedside due to c/f low pressures on cuff. Patient was mentating well, alert, denied CP, SOB. Manual BP checked and cuff recycled at showed BP 95/60 MAP >65

## 2025-05-28 NOTE — ED NOTES
Pt requesting to not use a pure wick and to continue to use bed pan due to not being able to stay still for pure wick to work properly     Isa Medina RN  05/27/25 [id number]

## 2025-05-28 NOTE — ASSESSMENT & PLAN NOTE
Lab Results   Component Value Date    K 5.1 05/28/2025    K 3.3 (L) 05/28/2025    K 3.8 05/27/2025     Continue to monitor and replete  Keep K > 4

## 2025-05-28 NOTE — ASSESSMENT & PLAN NOTE
- has not drink in last 3 days d/t not feeling well  - typically drinks daily   - 1-2 glasses of wine per day  - per ER team on arrival to ED was tremulous, tachycardic, and HTN   -concern for alcohol withdrawal and was given 10 mg/kg phenobarb in ED    Plan:  - [facility] protocol  - encourage cessation  - offer detox program after treatment of medical issues

## 2025-05-28 NOTE — QUICK NOTE
Subjective: new onset lower extremy edema  [place]  BiPAP for hypoxia    24 Hour Events: ***    VS Ranges: T: ***, HR: ***, BP: ***, RR: ***, SpO2 ***    Physical Exam: ***    I: ***  O: ***  Net: ***    Micro: ***  BCx: ***  SCx: ***  Urine Cx: ***    Imaging review: ***  ECHO: ***   PFT: ***    Assessment:   Hyperbilirubiminemia    Neuro:   Cards: no additional diuretics s/p 80mg laxis today, Heparin gtt, metoprolol IV, Digoxin. Cardiology consult for afib with RVR  Pulm: hfnc  GI: resume PO, famotidine   Nephro: no acute issues,   Nephro dose adjustments: ***  : ***  Endo:   ID:  no acute issues.   Heme/onc: thrombocytopenia  MSK: no active issues   DVT: heparin gtt    ETT: mid  NG/PEG: no  Lines:  2 PIV  CVC/A-line: no  Velazquez: no  Rectal Tube: no  PT/OT : no  Lab holiday: BMP in afternoon   Code status: full     IV -> PO:  if patient can tolerate midflow.

## 2025-05-28 NOTE — CASE MANAGEMENT
Case Management Progress Note    Patient name Kourtney Jimenez  Location ICU 07/ICU 07 MRN [id number]  : 1960 Date 2025       LOS (days): 1  Geometric Mean LOS (GMLOS) (days): 3.9  Days to GMLOS:3        OBJECTIVE:    Current admission status: Inpatient  Preferred Pharmacy:   [id number] - [place] - [address]  [address] [facility] [place] [address]  Phone: [phone] Fax: [phone]    Primary Care Provider: Valerie Aguilar MD    Primary Insurance: MEDICARE  Secondary Insurance: [employer]    PROGRESS NOTE:      CM received consult for BUDDY/OUD. CM contacted [facility]  to refer patient and provided minimal necessary information. CRS to meet with patient and follow up with CM to provide update on plan of care following patient connection.      A user error has taken place: encounter opened in error, closed for administrative reasons.

## 2025-05-28 NOTE — ASSESSMENT & PLAN NOTE
Recent Labs     05/27/25  1333 05/27/25 2007 05/28/25  0433   K 5.1 3.8 3.3*   MG  --  1.7* 2.0   Replete

## 2025-05-28 NOTE — RESPIRATORY THERAPY NOTE
05/28/25 0728   Respiratory Assessment   Assessment Type Assess only   General Appearance Sleeping;Lethargic   Respiratory Pattern Symmetrical;Assisted   Chest Assessment Chest expansion symmetrical   Bilateral Breath Sounds Diminished;Clear   Resp Comments patient recieve on bipap on documented settings. Keep patient on bipap per AP   O2 Device bipap   Non-Invasive Information   O2 Interface Device Full face mask   Non-Invasive Ventilation Mode BiPAP   SpO2 98 %   $ Pulse Oximetry Spot Check Charge Completed   Non-Invasive Settings   IPAP (cm) 16 cm   EPAP (cm) 6 cm   Rate (Set) 6   FiO2 (%) 30   Rise Time 3   Inspiratory Time (Set) 1.1   Non-Invasive Readings   Total Rate 20   MV (Mech) 9   Peak Pressure (Obs) 16   Spontaneous Vt (mL) 466   Leak (lpm) 18   Skin Intervention Skin intact   Non-Invasive Alarms   Insp Pressure High (cm H20) 20   Insp Pressure Low (cm H20) 10   Low Insp Pressure Time (sec) 20 sec   MV Low (L/min) 3   Vt High (mL) 40   Vt Low (mL) 8   High Resp Rate (BPM) 1200 BPM   Low Resp Rate (BPM) 175 BPM   Apnea Interval (sec) 20

## 2025-05-28 NOTE — ASSESSMENT & PLAN NOTE
Family session held with both parents and PT  Pt was asked for three things her parents could assist with or that she needs from her parents at this time for further support  Pt asked for parents to engage her even when she resists because she identified feeling much better on the unit being around people  Parents were open to this and told Pt they would love to spend more time with her  Pt asked for weekly session to be consistent as she need this support  Parents were in agreement  Pt also asked for parents to be patient with her at times because she cant identify why she is agitated and sometimes them continuing to ask makes her feel worse  Parents were agreeable to this also  Discussed social media diet and Pt did agree she has felt better on the unit without having her phone constantly, sleeping better, and less anxious  Discussed having phone off and having consistent bedtime routine  Allowing phone time after school but not a dinner table and during family interaction  Wt Readings from Last 3 Encounters:   05/28/25 76.5 kg (168 lb 10.4 oz)   06/08/20 67.1 kg (148 lb)   05/11/20 67.1 kg (148 lb)   Presented with cough, SOB, fatigue, and BLE edema for the past 3 days  Has been having CARBAJAL for the past month especially with going up stairs  Suspicion for new onset CHF    CXR (5/27/25) - increased interstitial lung markings, suggestive of pulmonary edema  CTA PE study (5/27/25) - no PE.  Moderate right effusion with small to moderate left effusion with groundglass haziness in the lungs, correlate with CHF  CXR (5/28/25) - improving pulmonary edema and pleural effusions  With acute hypoxic respiratory failure.  Required BiPAP and ICU admission for hypoxia and increased work of breathing  Received IV Lasix 40 mg x 2 with improvement in symptoms  Weaned down to 4 L NC  Likely precipitated by A-fib with RVR    Plan:  IV Lopressor 5 mg q6h  Give IV Lasix 40 mg dose later this evening  Follow-up on echocardiogram  Daily weights  Daily I/O's  Keep K > 4, Mg > 2

## 2025-05-28 NOTE — PROGRESS NOTES
Progress Note - Critical Care/ICU   Name: Kourtney Jimenez 64 y.o. female I MRN: [id number]  Unit/Bed#: ICU 07 I Date of Admission: 5/27/2025   Date of Service: 5/28/2025 I Hospital Day: 1      Assessment & Plan  CHF (congestive heart failure) (HCC)  Wt Readings from Last 3 Encounters:   05/28/25 76.5 kg (168 lb 10.4 oz)   06/08/20 67.1 kg (148 lb)   05/11/20 67.1 kg (148 lb)     - new onset  - pulmonary edema and pleural effusions on CT  - requiring BiPAP in ED   - attempt trial off that was unsuccessful  - 40 mg lasix IV given in ED (lasix naive)  - elevated BNP  - likely 2/2 a-fib RVR    Plan:  - monitor I/Os  - consider additional diuretics   - continue BiPAP, consider trial off in AM    A-fib (HCC)  - new onset for patient  - rates 160s - 110s  - likely cause of CHF  - given metoprolol in ED and Cardizem by EMS  TSH WNL    Plan:  - Metoprolol 5 mg every 6  - consider ammio vs digoxin for rate control in setting of CHF  Major depressive disorder, recurrent, moderate (HCC)  - on lexapro and Atarax at home  - is NPO at this time d/t BiPAP   - will continue once no NPO  Pleural effusion  - seen on CT in ED  - likely 2/2 CHF    Plan:  - Continue diuresis  - BiPAP, will trial off  Alcohol use disorder  - has not drink in last 3 days d/t not feeling well  - typically drinks daily   - 1-2 glasses of wine per day  - per ER team on arrival to ED was tremulous, tachycardic, and HTN   -concern for alcohol withdrawal and was given 10 mg/kg phenobarb in ED    Plan:  - [facility] protocol  - encourage cessation  - offer detox program after treatment of medical issues  -Thiamine and folate supplementation  Acute hypoxic respiratory failure (HCC)  Plan under heart failure  Hypokalemia  Recent Labs     05/27/25  1333 05/27/25 2007 05/28/25  0433   K 5.1 3.8 3.3*   MG  --  1.7* 2.0   Replete  Disposition: Med Surg    ICU Core Measures     A: Assess, Prevent, and Manage Pain Has pain been assessed? Yes  Need for changes to pain regimen?  No   B: Both SAT/SAT  N/A   C: Choice of Sedation RASS Goal: 0 Alert and Calm  Need for changes to sedation or analgesia regimen? No   D: Delirium CAM-ICU: Negative   E: Early Mobility  Plan for early mobility? No   F: Family Engagement Plan for family engagement today? Yes         Prophylaxis:  VTE VTE covered by:  heparin (porcine), Intravenous, 12 Units/kg/hr at 05/28/25 0339  heparin (porcine), Intravenous  heparin (porcine), Intravenous       Stress Ulcer  not ordered       Hospital day 2, ICU 2  Kourtney Jimenez is a 64 y.o. alcohol use disorder, CHF, who presented to [facility] ED with shortness of breath and A-fib with RVR.  Patient went to urgent care due to 3 days of cough, shortness of breath, fatigue/new onset lower extremity edema.  Referred to ER.  Patient received Cardizem 15 mg IV by EMS and was also given IVF. Initially on dilt gtt, dc/d Emergency department contacted cardiology who recommended metoprolol push doses for A-fib with RVR.  Patient was placed on BiPAP in the emergency department with an attempted trial off BiPAP but was unsuccessful.  Patient was also given 40 mg IV Lasix.  24 Hour Events : Telemetry reviewed: A-fib with RVR up to 160s  Patient given 200 mcg digoxin this AM  Subjective   Patient denies fever, chest pain, abdominal pain, nausea, vomiting, and signs symptoms at this time.  Reported that she was wheezing yesterday, breathing improved today.  Previously had a cough, currently.    Objective :         2 peripheral IV           Vitals I/O      Most Recent Min/Max in 24hrs   Temp 98.4 °F (36.9 °C) Temp  Min: 98 °F (36.7 °C)  Max: 98.4 °F (36.9 °C)   Pulse (!) 145 Pulse  Min: 112  Max: 164   Resp (!) 25 Resp  Min: 13  Max: 32   BP 91/64 BP  Min: 88/71  Max: 132/69   O2 Sat 98 % SpO2  Min: 92 %  Max: 100 %   BiPAP: 16/6/6/30   Intake/Output Summary (Last 24 hours) at 5/28/2025 0719  Last data filed at 5/28/2025 0601  Gross per 24 hour   Intake 1137.9 ml   Output 1795 ml   Net -657.1 ml        Diet NPO    Invasive Monitoring           Physical Exam   Physical Exam  Skin:     General: Skin is warm.      Coloration: Skin is not jaundiced.   HENT:      Nose: No congestion or rhinorrhea.   Cardiovascular:      Rate and Rhythm: Tachycardia present. Rhythm irregular.      Heart sounds: Normal heart sounds.   Musculoskeletal:      Right lower leg: Trace Edema present.      Left lower leg: Trace Edema present.   Constitutional:       Appearance: She is ill-appearing.   Pulmonary:      Effort: No respiratory distress.      Breath sounds: No wheezing or rhonchi.      Comments: Diminished breath sounds at the bases  Neurological:      Mental Status: She is alert and oriented to person, place and time. She is calm.      Cranial Nerves: No facial asymmetry.      Motor: No motor deficit.          Diagnostic Studies        Lab Results: I have reviewed the following results:     Medications:  Scheduled PRN   calcium gluconate, 1 g, Once  chlorhexidine, 15 mL, Q12H VIKTORIA  [Held by provider] escitalopram, 10 mg, Daily  famotidine, 20 mg, Daily  folic acid 1 mg in sodium chloride 0.9 % 50 mL IVPB, 1 mg, Daily  metoprolol, 5 mg, Q6H  [Held by provider] multivitamin-minerals, 1 tablet, Daily  potassium chloride, 20 mEq, Once  potassium chloride, 20 mEq, Q2H   Followed by  potassium chloride, 20 mEq, Once  thiamine, 500 mg, Daily  [START ON 5/31/2025] thiamine, 100 mg, Daily      heparin (porcine), 2,000 Units, Q6H PRN  heparin (porcine), 4,000 Units, Q6H PRN  [Held by provider] hydrOXYzine HCL, 25 mg, Q6H PRN       Continuous    heparin (porcine), 3-20 Units/kg/hr (Order-Specific), Last Rate: 12 Units/kg/hr (05/28/25 0339)         Labs:   CBC    Recent Labs     05/27/25 2007 05/28/25  0433   WBC 4.91 4.07*   HGB 12.7 11.8   HCT 38.9 37.7   * 102*      Lab Results   Component Value Date     (H) 05/27/2025      Lab Results   Component Value Date    HSTNI0 20 05/27/2025    HSTNI2 19 05/27/2025    HSTNID2 -1  05/27/2025     BMP    Recent Labs     05/27/25 2007 05/28/25  0433   SODIUM 140 139   K 3.8 3.3*    106   CO2 23 21   AGAP 10 12   BUN 9 9   CREATININE 0.57* 0.53*   CALCIUM 8.8 7.8*       Coags    Recent Labs     05/27/25  1902 05/28/25  0221   INR 1.16  --    PTT 27 86*        Additional Electrolytes  Recent Labs     05/27/25 2007 05/28/25  0433   MG 1.7* 2.0   PHOS 3.7 3.7   CAIONIZED 1.12  --           Blood Gas    No recent results  Recent Labs     05/27/25 1902   PHVEN 7.393   WNI1UZJ 31.8*   PO2VEN 45.4*   UPO5WVJ 18.9*   BEVEN -4.9   [id number] 78.4    LFTs  Recent Labs     05/27/25 2007 05/28/25  0433   ALT 54* 43   AST 40* 33   ALKPHOS 68 61   ALB 4.0 3.4*   TBILI 2.24* 1.78*       Infectious  No recent results   Venous duplex, negative for DVT  CT PE negative for PE, moderate bilateral pleural effusions right greater than left Glucose  Recent Labs     05/27/25  1333 05/27/25 2007 05/28/25  0433   GLUC 117 93 75      Acute hypoxic respiratory failure due to volume overload in the setting of heart failure exacerbation and A-fib with RVR new onset  Neuro: Pain controlled, no acute issues.  Holding Lexapro and Atarax Home meds.  Status post phenobarbital, CIWA protocol.  Cards: Possible heart failure exacerbation and new onset A-fib with RVR, follow-up echo, continue heparin drip for A-fib, rate control w metoprolol. Digoxin vs amio?  Pulm: Hypoxic respiratory failure on BiPAP, wean off BiPAP, continue diuresis Lasix 40 BID?  GI: N.p.o. while on BiPAP, famotidine for GI prophylaxis  Nephro: No acute issues, creatinine at baseline  : No Velazquez  Endo: Sugars WNL, follow-up TSH  ID: No acute issues  Heme/onc: Folic acid and thiamine, heparin drip for A-fib  MSK: No acute issues  DVT: Heparin drip  Stepdown 1 or MedSurg with telemetry?     Lines:    CVC/A-line:   Velazquez:   Rectal Tube:   Lab holiday:   Code status: Level 1

## 2025-05-28 NOTE — ASSESSMENT & PLAN NOTE
Lab Results   Component Value Date    MG 2.0 05/28/2025    MG 1.7 (L) 05/27/2025     Continue to monitor and replete  Keep Mg > 2

## 2025-05-28 NOTE — INCIDENTAL FINDINGS
The following findings require follow up:  Radiographic finding   Finding: No pulmonary embolism seen. Nodular groundglass density right middle lobe, nonspecific may be due to edema   Follow up required: Follow-up suggested at 3/6 months to demonstrate resolution    Follow up should be done within 3-6 month(s)    Please notify the following clinician to assist with the follow up:   Dr. Valerie Aguilar    Incidental finding results were discussed with the Patient by Nicole Lovell MD on 05/28/25.   They expressed understanding and all questions answered.

## 2025-05-28 NOTE — ASSESSMENT & PLAN NOTE
Wt Readings from Last 3 Encounters:   05/28/25 76.5 kg (168 lb 10.4 oz)   06/08/20 67.1 kg (148 lb)   05/11/20 67.1 kg (148 lb)     - new onset  - pulmonary edema and pleural effusions on CT  - requiring BiPAP in ED   - attempt trial off that was unsuccessful  - 40 mg lasix IV given in ED (lasix naive)  - elevated BNP  - likely 2/2 a-fib RVR    Plan:  - monitor I/Os  - consider additional diuretics   - continue BiPAP, consider trial off in AM

## 2025-05-28 NOTE — H&P
H&P - Critical Care/ICU   Name: Kourtney Jimenez 64 y.o. female I MRN: [id number]  Unit/Bed#: ICU 07 I Date of Admission: 5/27/2025   Date of Service: 5/27/2025 I Hospital Day: 0       Assessment & Plan  Major depressive disorder, recurrent, moderate (HCC)  - on lexapro at home  - is NPO at this time d/t BiPAP   - will continue once no NPO  CHF (congestive heart failure) (HCC)  Wt Readings from Last 3 Encounters:   05/27/25 75 kg (165 lb 5.5 oz)   06/08/20 67.1 kg (148 lb)   05/11/20 67.1 kg (148 lb)     - new onset  - pulmonary edema and pleural effusions on CT  - requiring BiPAP in ED   - attempt trial off that was unsuccessful  - 40 mg lasix IV given in ED (lasix naive)  - elevated BNP  - likely 2/2 a-fib RVR    Plan:  - monitor I/Os  - consider additional diuretics   - continue BiPAP, consider trial off in AM    A-fib (HCC)  - new onset for patient  - rates 160s - 110s  - likely cause of CHF  - given metoprolol in ED and Cardizem by EMS    Plan:  - consider cardiology consult  - consider ammio vs digoxin for rate control in setting of CHF  Pleural effusion  - seen on CT in ED  - likely 2/2 CHF    Plan:  - diuresis  - BiPAP  - consider other causes  Alcohol use disorder  - has not drink in last 3 days d/t not feeling well  - typically drinks daily   - 1-2 glasses of wine per day  - per ER team on arrival to ED was tremulous, tachycardic, and HTN   -concern for alcohol withdrawal and was given 10 mg/kg phenobarb in ED    Plan:  - CIWA protocol  - encourage cessation  - offer detox program after treatment of medical issues  Disposition: Stepdown Level 1    History of Present Illness   Kourtney Jimenez is a 64 y.o. who presented to [facility] ED with shortness of breath and A-fib with RVR.  Patient was initially seen at urgent care and was transferred to the emergency department via ambulance.  Patient received Cardizem 15 mg IV by EMS and was also given IV fluids.  Patient was hypotensive so the drip was discontinued.   Patient went to urgent care for 3 days of feeling unwell with cough, shortness of breath and fatigue.  Patient also reports new onset lower extremity edema.  On arrival to the emergency department she was notably anxious appearing, diaphoretic and a tremor she she has a history of alcohol use disorder and her last drink was 3 days ago.  Patient states she stopped drinking alcohol due to feeling unwell.  Emergency department contacted cardiology who recommended metoprolol push doses for A-fib with RVR.  Patient was placed on BiPAP in the emergency department with an attempted trial off BiPAP but was unsuccessful.  Patient was also given 40 mg IV Lasix in the ED given that patient is Lasix naïve.  Patient denies fever, chest pain, abdominal pain, nausea, vomiting, and signs symptoms at this time.    History obtained from spouse, chart review, and the patient.  Review of Systems: See HPI for Review of Systems    Historical Information   No past medical history on file. Past Surgical History:  No date: WISDOM TOOTH EXTRACTION   Current Outpatient Medications   Medication Instructions    escitalopram (LEXAPRO) 10 mg, Oral, Daily    hydrOXYzine HCL (ATARAX) 25 mg, Oral, Every 6 hours PRN    multivitamin (THERAGRAN) TABS 1 tablet, Daily    Allergies[1]   Social History[2] Family History[3]       Objective :                   Vitals I/O      Most Recent Min/Max in 24hrs   Temp 98 °F (36.7 °C) Temp  Min: 98 °F (36.7 °C)  Max: 98 °F (36.7 °C)   Pulse (!) 136 Pulse  Min: 112  Max: 164   Resp 20 Resp  Min: 20  Max: 32   /83 BP  Min: 103/81  Max: 132/69   O2 Sat 95 % SpO2  Min: 92 %  Max: 98 %      Intake/Output Summary (Last 24 hours) at 5/27/2025 2351  Last data filed at 5/27/2025 2227  Gross per 24 hour   Intake 1050 ml   Output 1095 ml   Net -45 ml       Diet NPO    Invasive Monitoring           Physical Exam   Physical Exam  Vitals and nursing note reviewed.   Eyes:      General: Lids are normal.      Extraocular  Movements: Extraocular movements intact.      Pupils: Pupils are equal, round, and reactive to light.   Skin:     General: Skin is warm and dry.      Capillary Refill: Capillary refill takes less than 2 seconds.      Comments: No Rashes or wounds or skin breakdown.   HENT:      Head: Normocephalic and atraumatic.      Right Ear: External ear normal.      Left Ear: External ear normal.      Nose: Nose normal.      Mouth/Throat:      Mouth: Mucous membranes are moist. No oral lesions.      Pharynx: Oropharynx is clear. Uvula midline.   Neck:      Trachea: No tracheal deviation.   Cardiovascular:      Rate and Rhythm: Tachycardia present. Rhythm irregularly irregular.      Pulses: Normal pulses.      Heart sounds: No murmur heard.  Musculoskeletal:      Cervical back: Normal range of motion and neck supple.      Right lower le+ Pitting Edema present.      Left lower le+ Pitting Edema present.      Comments: Moves all extremities equally.  Distal pulses intact.  Sensation intact to all 4 extremities.   Abdominal: General: Bowel sounds are normal. There is no distension.      Palpations: Abdomen is soft.      Tenderness: There is no abdominal tenderness. There is no guarding or rebound.   Constitutional:       General: She is in acute distress.      Appearance: She is ill-appearing (Acute on chronic ill-appearing).   Pulmonary:      Effort: Tachypnea present. No accessory muscle usage, respiratory distress or accessory muscle usage.      Breath sounds: Wheezing (Throughout all upper lung fields) present. No rhonchi or rales.      Comments: Diminished breath sounds in bilateral bases, most likely due to bilateral pleural effusions seen on imaging.  On BiPAP  Psychiatric:         Behavior: Behavior is cooperative.   Neurological:      General: No focal deficit present.      Mental Status: She is alert and oriented to person, place, and time.      GCS: GCS eye subscore is 4. GCS verbal subscore is 5. GCS motor  subscore is 6.   Genitourinary/Anorectal:     Comments: voiding         Diagnostic Studies        Lab Results: I have reviewed the following results:     Medications:  Scheduled PRN   chlorhexidine, 15 mL, Q12H VIKTORIA  [Held by provider] escitalopram, 10 mg, Daily  [START ON 5/28/2025] folic acid 1 mg in sodium chloride 0.9 % 50 mL IVPB, 1 mg, Daily  [START ON 5/28/2025] metoprolol, 5 mg, Q6H  [Held by provider] multivitamin-minerals, 1 tablet, Daily  [START ON 5/28/2025] thiamine, 500 mg, Daily  [START ON 5/31/2025] thiamine, 100 mg, Daily      heparin (porcine), 2,000 Units, Q6H PRN  heparin (porcine), 4,000 Units, Q6H PRN  [Held by provider] hydrOXYzine HCL, 25 mg, Q6H PRN       Continuous    heparin (porcine), 3-20 Units/kg/hr (Order-Specific), Last Rate: 12 Units/kg/hr (05/27/25 2015)         Labs:   CBC    Recent Labs     05/27/25 1333 05/27/25 2007   WBC 5.05 4.91   HGB 13.1 12.7   HCT 40.5 38.9   * 124*     BMP    Recent Labs     05/27/25 1333 05/27/25 2007   SODIUM 137 140   K 5.1 3.8    107   CO2 22 23   AGAP 11 10   BUN 9 9   CREATININE 0.66 0.57*   CALCIUM 8.6 8.8       Coags    Recent Labs     05/27/25 1902   INR 1.16   PTT 27        Additional Electrolytes  Recent Labs     05/27/25 2007   MG 1.7*   PHOS 3.7   CAIONIZED 1.12          Blood Gas    No recent results  Recent Labs     05/27/25 1902   PHVEN 7.393   MIP8DDM 31.8*   PO2VEN 45.4*   [id number] 18.9*   BEVEN -4.9   [id number] 78.4    LFTs  Recent Labs     05/27/25 1333 05/27/25 2007   ALT 55* 54*   AST 51* 40*   ALKPHOS 65 68   ALB 3.9 4.0   TBILI 2.43* 2.24*       Infectious  No recent results  Glucose  Recent Labs     05/27/25  1333 05/27/25  2007   GLUC 117 93               [1] No Known Allergies  [2]   Social History  Tobacco Use    Smoking status: Former     Current packs/day: 0.00     Average packs/day: 0.5 packs/day for 15.0 years (7.5 ttl pk-yrs)     Types: Cigarettes     Start date: 1/1/1980     Quit date: 1/1/1995     Years  since quittin.4    Smokeless tobacco: Never   Vaping Use    Vaping status: Never Used   Substance Use Topics    Alcohol use: Yes     Alcohol/week: 3.0 standard drinks of alcohol     Types: 3 Glasses of wine per week    Drug use: Never   [3]   Family History  Problem Relation Name Age of Onset    Dementia Mother Jackie     Depression Mother Jackie     Hypotension Mother Jackie     Coronary artery disease Father Stuart     Cancer Father Stuart         mouth cancer    No Known Problems Sister Beryl     Rheum arthritis Brother Stuart Saunders     Anxiety disorder Daughter Marlin     Hypertension Sister Eli     No Known Problems Daughter Doris

## 2025-05-28 NOTE — ASSESSMENT & PLAN NOTE
Has been having palpitations intermittently in the past  Presented with new onset Afib HR 150s-160s  Likely secondary to alcohol use  Likely precipitated CHF exacerbation  Initially received IV Cardizem 15 mg and was subsequently put on IV Cardizem gtt  Transitioned to IV metoprolol 2.5 mg q6h  [id number] score 3  Currently on heparin drip  Received IV digoxin 250 mcg    Plan:  Increase to IV metoprolol 5 mg q6h  IV digoxin 125 mcg q6h for 2 more doses  Can transition from heparin gtt to DOAC  Monitor on telemetry  Follow-up on echocardiogram

## 2025-05-28 NOTE — ASSESSMENT & PLAN NOTE
- has not drink in last 3 days d/t not feeling well  - typically drinks daily   - 1-2 glasses of wine per day  - per ER team on arrival to ED was tremulous, tachycardic, and HTN   -concern for alcohol withdrawal and was given 10 mg/kg phenobarb in ED    Plan:  - [facility] protocol  - encourage cessation  - offer detox program after treatment of medical issues  -Thiamine and folate supplementation

## 2025-05-28 NOTE — ASSESSMENT & PLAN NOTE
- new onset for patient  - rates 160s - 110s  - likely cause of CHF  - given metoprolol in ED and Cardizem by EMS  TSH WNL    Plan:  - Metoprolol 5 mg every 6  - consider ammio vs digoxin for rate control in setting of CHF

## 2025-05-28 NOTE — PLAN OF CARE
Problem: PAIN - ADULT  Goal: Verbalizes/displays adequate comfort level or baseline comfort level  Description: Interventions:  - Encourage patient to monitor pain and request assistance  - Assess pain using appropriate pain scale  - Administer analgesics as ordered based on type and severity of pain and evaluate response  - Implement non-pharmacological measures as appropriate and evaluate response  - Consider cultural and social influences on pain and pain management  - Notify physician/advanced practitioner if interventions unsuccessful or patient reports new pain  - Educate patient/family on pain management process including their role and importance of  reporting pain   - Provide non-pharmacologic/complimentary pain relief interventions  Outcome: Progressing     Problem: INFECTION - ADULT  Goal: Absence or prevention of progression during hospitalization  Description: INTERVENTIONS:  - Assess and monitor for signs and symptoms of infection  - Monitor lab/diagnostic results  - Monitor all insertion sites, i.e. indwelling lines, tubes, and drains  - Monitor endotracheal if appropriate and nasal secretions for changes in amount and color  - Lug[place] appropriate cooling/warming therapies per order  - Administer medications as ordered  - Instruct and encourage patient and family to use good hand hygiene technique  - Identify and instruct in appropriate isolation precautions for identified infection/condition  Outcome: Progressing  Goal: Absence of fever/infection during neutropenic period  Description: INTERVENTIONS:  - Monitor WBC  - Perform strict hand hygiene  - Limit to healthy visitors only  - No plants, dried, fresh or silk flowers with alan in patient room  Outcome: Progressing     Problem: SAFETY ADULT  Goal: Patient will remain free of falls  Description: INTERVENTIONS:  - Educate patient/family on patient safety including physical limitations  - Instruct patient to call for assistance with activity   -  Consider consulting OT/PT to assist with strengthening/mobility based on AM PAC & JH-HLM score  - Consult OT/PT to assist with strengthening/mobility   - Keep Call bell within reach  - Keep bed low and locked with side rails adjusted as appropriate  - Keep care items and personal belongings within reach  - Initiate and maintain comfort rounds  - Make Fall Risk Sign visible to staff  - Offer Toileting every 2 Hours, in advance of need  - Initiate/Maintain bedalarm  - Apply yellow socks and bracelet for high fall risk patients  - Consider moving patient to room near nurses station  Outcome: Progressing  Goal: Maintain or return to baseline ADL function  Description: INTERVENTIONS:  -  Assess patient's ability to carry out ADLs; assess patient's baseline for ADL function and identify physical deficits which impact ability to perform ADLs (bathing, care of mouth/teeth, toileting, grooming, dressing, etc.)  - Assess/evaluate cause of self-care deficits   - Assess range of motion  - Assess patient's mobility; develop plan if impaired  - Assess patient's need for assistive devices and provide as appropriate  - Encourage maximum independence but intervene and supervise when necessary  - Involve family in performance of ADLs  - Assess for home care needs following discharge   - Consider OT consult to assist with ADL evaluation and planning for discharge  - Provide patient education as appropriate  - Monitor functional capacity and physical performance, use of AM PAC & JH-HLM   - Monitor gait, balance and fatigue with ambulation    Outcome: Progressing  Goal: Maintains/Returns to pre admission functional level  Description: INTERVENTIONS:  - Perform AM-PAC 6 Click Basic Mobility/ Daily Activity assessment daily.  - Set and communicate daily mobility goal to care team and patient/family/caregiver.   - Collaborate with rehabilitation services on mobility goals if consulted  - Perform Range of Motion 3 times a day.  - Reposition  patient every 2 hours.  - Out of bed to chair 3 times a day   - Out of bed for meals 3 times a day  - Out of bed for toileting  - Record patient progress and toleration of activity level   Outcome: Progressing

## 2025-05-28 NOTE — CONSULTS
Consultation - Cardiology   Name: Kourtney Jimenez 64 y.o. female I MRN: [id number]  Unit/Bed#: ICU 07 I Date of Admission: 5/27/2025   Date of Service: 5/28/2025 I Hospital Day: 1   Inpatient consult to Cardiology  Consult performed by: Armond Mares DO  Consult ordered by: Angelina Mattson DO        Physician Requesting Evaluation: Wallace Lyon MD   Reason for Evaluation / Principal Problem: New onset A-fib with RVR, CHF exacerbation    Assessment & Plan  CHF (congestive heart failure) (HCC)  Wt Readings from Last 3 Encounters:   05/28/25 76.5 kg (168 lb 10.4 oz)   06/08/20 67.1 kg (148 lb)   05/11/20 67.1 kg (148 lb)   Presented with cough, SOB, fatigue, and BLE edema for the past 3 days  Has been having CARBAJAL for the past month especially with going up stairs  Suspicion for new onset CHF    CXR (5/27/25) - increased interstitial lung markings, suggestive of pulmonary edema  CTA PE study (5/27/25) - no PE.  Moderate right effusion with small to moderate left effusion with groundglass haziness in the lungs, correlate with CHF  CXR (5/28/25) - improving pulmonary edema and pleural effusions  With acute hypoxic respiratory failure.  Required BiPAP and ICU admission for hypoxia and increased work of breathing  Received IV Lasix 40 mg x 2 with improvement in symptoms  Weaned down to 4 L NC  Likely precipitated by A-fib with RVR    Plan:  IV Lopressor 5 mg q6h  Give IV Lasix 40 mg dose later this evening  Follow-up on echocardiogram  Daily weights  Daily I/O's  Keep K > 4, Mg > 2  Atrial fibrillation with RVR (HCC)  Has been having palpitations intermittently in the past  Presented with new onset Afib HR 150s-160s  Likely secondary to alcohol use  Likely precipitated CHF exacerbation  Initially received IV Cardizem 15 mg and was subsequently put on IV Cardizem gtt  Transitioned to IV metoprolol 2.5 mg q6h  [id number] score 3  Currently on heparin drip  Received IV digoxin 250 mcg    Plan:  Increase  to IV metoprolol 5 mg q6h  IV digoxin 125 mcg q6h for 2 more doses  Can transition from heparin gtt to DOAC  Monitor on telemetry  Follow-up on echocardiogram  Acute hypoxic respiratory failure (HCC)  Required BIPAP and ICU admission  Secondary to CHF exacerbation  Currently on 4 L NC  Wean off supplemental oxygen as tolerated  Anxiety and depression  No longer taking Lexapro  Alcohol use disorder  Drinks 1-2 glasses of wine daily.  Sometimes more.  For the past 20 years  Last drink on 5/24  Received phenobarbital in the ED  CIWA protocol  Per primary team  Hypokalemia  Lab Results   Component Value Date    K 5.1 05/28/2025    K 3.3 (L) 05/28/2025    K 3.8 05/27/2025     Continue to monitor and replete  Keep K > 4  Hypomagnesemia  Lab Results   Component Value Date    MG 2.0 05/28/2025    MG 1.7 (L) 05/27/2025     Continue to monitor and replete  Keep Mg > 2    History of Present Illness   Kourtney Jimenez is a 64 y.o. female with PMH of depression, anxiety, and alcohol use disorder who presents with cough, SOB, fatigue, and BLE edema for the past 3 days.  She states that she has been having palpitations intermittently in the past.  She endorses having CARBAJAL for the past month especially with going up stairs.  She denies chest pain, diaphoresis, N/V, PND, orthopnea.  She does not add extra salt to foods, but she does eat frozen foods.  She denies history of HTN or CHF.  She drinks alcohol daily.  She states that she drinks alcohol daily.  She has at 1-2 glass of wine per night, many times more than that for the past 20 years.  Her last drink was on 5/24.  She was a former smoker.  She denies any illicit drugs.  Per , patient snores.  She had not had evaluation for MILADIS in the past.    Upon presentation to the ED, she was found to be in new onset Afib with RVR with heart rates 150s-160s.  Troponins were negative.  CBC significant for , platelets 108.  CMP significant for AST 51, ALT 55, TBili 2.43.  .   Mg 1.7.  TSH 1.340.  CXR showed pulmonary edema.  CTA PE study was negative for PE.  There was moderate right-sided pleural effusion with small to moderate left-sided pleural effusion.  US BLE was negative for DVT.     She had hypoxia and increased work of breathing for which she was put on BiPAP.  She received IV Cardizem 15 mg push and was subsequently started on IV Cardizem drip.  She was transitioned off Cardizem drip and switched over to IV metoprolol 2.5 mg q6h due to concern for new onset CHF exacerbation.  She received IV Lasix 40 mg and was started on heparin drip.  She was unable to be weaned off of BiPAP and was admitted to the ICU.  She received another dose of IV Lasix 40 mg as well as IV Digoxin 250 mcg dose on 5/28 AM.  Cardiology is consulted for new-onset Afib with RVR and CHF exacerbation.      Review of Systems   Constitutional:  Positive for fatigue. Negative for chills and fever.   Respiratory:  Positive for cough and shortness of breath.    Cardiovascular:  Positive for leg swelling.   Gastrointestinal:  Negative for abdominal pain, diarrhea, nausea and vomiting.   Skin:  Negative for rash and wound.   Neurological:  Negative for light-headedness and headaches.     Medical History Review: I have reviewed the patient's PMH, PSH, Social History, Family History, Meds, and Allergies     Objective :  Temp:  [97.4 °F (36.3 °C)-98.4 °F (36.9 °C)] 97.9 °F (36.6 °C)  HR:  [112-148] 120  BP: ()/(59-88) 117/87  Resp:  [13-30] 20  SpO2:  [95 %-100 %] 97 %  O2 Device: Nasal cannula  Nasal Cannula O2 Flow Rate (L/min):  [2 L/min-4 L/min] 4 L/min  FiO2 (%):  [30] 30  Orthostatic Blood Pressures      Flowsheet Row Most Recent Value   Blood Pressure 117/87 filed at 05/28/2025 1500   Patient Position - Orthostatic VS Lying filed at 05/28/2025 1107          First Weight: Weight - Scale: 75 kg (165 lb 5.5 oz) (05/27/25 1333)  Vitals:    05/27/25 1333 05/28/25 0534   Weight: 75 kg (165 lb 5.5 oz) 76.5 kg  "(168 lb 10.4 oz)       Physical Exam  Constitutional:       General: She is not in acute distress.     Appearance: Normal appearance. She is not ill-appearing or toxic-appearing.   HENT:      Head: Normocephalic and atraumatic.     Eyes:      Extraocular Movements: Extraocular movements intact.       Cardiovascular:      Rate and Rhythm: Tachycardia present. Rhythm irregular.      Heart sounds: No murmur heard.  Pulmonary:      Effort: No respiratory distress.      Breath sounds: No wheezing, rhonchi or rales.      Comments: On BIPAP  Abdominal:      General: There is no distension.      Tenderness: There is no abdominal tenderness. There is no guarding or rebound.     Musculoskeletal:         General: No swelling. Normal range of motion.      Right lower leg: No edema.      Left lower leg: No edema.     Skin:     General: Skin is warm.      Capillary Refill: Capillary refill takes less than 2 seconds.      Findings: No erythema or rash.     Neurological:      General: No focal deficit present.      Mental Status: She is alert and oriented to person, place, and time. Mental status is at baseline.         Lab Results: I have reviewed the following results:  Results from last 7 days   Lab Units 05/28/25  0433 05/27/25 2007 05/27/25  1333   WBC Thousand/uL 4.07* 4.91 5.05   HEMOGLOBIN g/dL 11.8 12.7 13.1   HEMATOCRIT % 37.7 38.9 40.5   PLATELETS Thousands/uL 102* 124* 108*     Results from last 7 days   Lab Units 05/28/25  1227 05/28/25  0433 05/27/25 2007   POTASSIUM mmol/L 5.1 3.3* 3.8   CHLORIDE mmol/L 103 106 107   CO2 mmol/L 23 21 23   BUN mg/dL 10 9 9   CREATININE mg/dL 0.54* 0.53* 0.57*   CALCIUM mg/dL 8.5 7.8* 8.8     Results from last 7 days   Lab Units 05/28/25  1008 05/28/25  0221 05/27/25  1902   INR   --   --  1.16   PTT seconds 77* 86* 27     No results found for: \"HGBA1C\"  No results found for: \"CKTOTAL\", \"CKMB\", \"CKMBINDEX\", \"TROPONINI\"    Imaging Results Review: I reviewed radiology reports from this " admission including: chest xray and CT chest.  Other Study Results Review: EKG was reviewed.     VTE Prophylaxis: VTE covered by:  heparin (porcine), Intravenous, 12 Units/kg/hr at 05/28/25 0339  heparin (porcine), Intravenous  heparin (porcine), Intravenous

## 2025-05-28 NOTE — ASSESSMENT & PLAN NOTE
Drinks 1-2 glasses of wine daily.  Sometimes more.  For the past 20 years  Last drink on 5/24  Received phenobarbital in the ED  CIWA protocol  Per primary team

## 2025-05-29 PROBLEM — E87.70 VOLUME OVERLOAD: Status: ACTIVE | Noted: 2025-05-27

## 2025-05-29 LAB
ANION GAP SERPL CALCULATED.3IONS-SCNC: 7 MMOL/L (ref 4–13)
AORTIC ROOT: 3.3 CM
APTT PPP: 49 SECONDS (ref 23–34)
APTT PPP: 70 SECONDS (ref 23–34)
APTT PPP: 83 SECONDS (ref 23–34)
BSA FOR ECHO PROCEDURE: 1.82 M2
BUN SERPL-MCNC: 12 MG/DL (ref 5–25)
CALCIUM SERPL-MCNC: 8.2 MG/DL (ref 8.4–10.2)
CHLORIDE SERPL-SCNC: 105 MMOL/L (ref 96–108)
CO2 SERPL-SCNC: 25 MMOL/L (ref 21–32)
CREAT SERPL-MCNC: 0.55 MG/DL (ref 0.6–1.3)
DIGOXIN SERPL-MCNC: 2.7 NG/ML (ref 0.8–2)
DOP CALC LVOT AREA: 3.14 CM2
DOP CALC LVOT DIAMETER: 2 CM
ERYTHROCYTE [DISTWIDTH] IN BLOOD BY AUTOMATED COUNT: 13.3 % (ref 11.6–15.1)
FRACTIONAL SHORTENING: 29 (ref 28–44)
GFR SERPL CREATININE-BSD FRML MDRD: 98 ML/MIN/1.73SQ M
GLUCOSE SERPL-MCNC: 85 MG/DL (ref 65–140)
HCT VFR BLD AUTO: 40.5 % (ref 34.8–46.1)
HGB BLD-MCNC: 13.1 G/DL (ref 11.5–15.4)
INTERVENTRICULAR SEPTUM IN DIASTOLE (PARASTERNAL SHORT AXIS VIEW): 0.7 CM
INTERVENTRICULAR SEPTUM: 0.7 CM (ref 0.6–1.1)
LEFT ATRIUM AREA SYSTOLE SINGLE PLANE A4C: 31.7 CM2
LEFT ATRIUM SIZE: 4.8 CM
LEFT INTERNAL DIMENSION IN SYSTOLE: 4.2 CM (ref 2.1–4)
LEFT VENTRICULAR INTERNAL DIMENSION IN DIASTOLE: 5.9 CM (ref 3.5–6)
LEFT VENTRICULAR POSTERIOR WALL IN END DIASTOLE: 0.8 CM
LEFT VENTRICULAR STROKE VOLUME: 92 ML
LV EF US.2D.A4C+ESTIMATED: 53 %
LVSV (TEICH): 92 ML
MAGNESIUM SERPL-MCNC: 1.8 MG/DL (ref 1.9–2.7)
MCH RBC QN AUTO: 34.2 PG (ref 26.8–34.3)
MCHC RBC AUTO-ENTMCNC: 32.3 G/DL (ref 31.4–37.4)
MCV RBC AUTO: 106 FL (ref 82–98)
PHOSPHATE SERPL-MCNC: 3.3 MG/DL (ref 2.3–4.1)
PLATELET # BLD AUTO: 120 THOUSANDS/UL (ref 149–390)
PMV BLD AUTO: 10.7 FL (ref 8.9–12.7)
POTASSIUM SERPL-SCNC: 3.7 MMOL/L (ref 3.5–5.3)
RA PRESSURE ESTIMATED: 3 MMHG
RBC # BLD AUTO: 3.83 MILLION/UL (ref 3.81–5.12)
RIGHT ATRIUM AREA SYSTOLE A4C: 15.6 CM2
RIGHT VENTRICLE ID DIMENSION: 3.7 CM
RV PSP: 34 MMHG
SL CV LV EF: 55
SL CV PED ECHO LEFT VENTRICLE DIASTOLIC VOLUME (MOD BIPLANE) 2D: 171 ML
SL CV PED ECHO LEFT VENTRICLE SYSTOLIC VOLUME (MOD BIPLANE) 2D: 79 ML
SODIUM SERPL-SCNC: 137 MMOL/L (ref 135–147)
TR MAX PG: 31 MMHG
TR PEAK VELOCITY: 2.8 M/S
TRICUSPID ANNULAR PLANE SYSTOLIC EXCURSION: 1.6 CM
TRICUSPID VALVE PEAK REGURGITATION VELOCITY: 2.78 M/S
WBC # BLD AUTO: 3.27 THOUSAND/UL (ref 4.31–10.16)

## 2025-05-29 PROCEDURE — 99232 SBSQ HOSP IP/OBS MODERATE 35: CPT | Performed by: INTERNAL MEDICINE

## 2025-05-29 PROCEDURE — 85027 COMPLETE CBC AUTOMATED: CPT

## 2025-05-29 PROCEDURE — 97163 PT EVAL HIGH COMPLEX 45 MIN: CPT

## 2025-05-29 PROCEDURE — 97166 OT EVAL MOD COMPLEX 45 MIN: CPT

## 2025-05-29 PROCEDURE — 83735 ASSAY OF MAGNESIUM: CPT

## 2025-05-29 PROCEDURE — 80162 ASSAY OF DIGOXIN TOTAL: CPT

## 2025-05-29 PROCEDURE — 85730 THROMBOPLASTIN TIME PARTIAL: CPT | Performed by: INTERNAL MEDICINE

## 2025-05-29 PROCEDURE — 85730 THROMBOPLASTIN TIME PARTIAL: CPT

## 2025-05-29 PROCEDURE — 93005 ELECTROCARDIOGRAM TRACING: CPT

## 2025-05-29 PROCEDURE — 80048 BASIC METABOLIC PNL TOTAL CA: CPT

## 2025-05-29 PROCEDURE — 84100 ASSAY OF PHOSPHORUS: CPT

## 2025-05-29 RX ORDER — METOPROLOL TARTRATE 50 MG
50 TABLET ORAL EVERY 12 HOURS SCHEDULED
Status: DISCONTINUED | OUTPATIENT
Start: 2025-05-29 | End: 2025-05-31 | Stop reason: HOSPADM

## 2025-05-29 RX ORDER — METOPROLOL TARTRATE 1 MG/ML
5 INJECTION, SOLUTION INTRAVENOUS ONCE
Status: COMPLETED | OUTPATIENT
Start: 2025-05-29 | End: 2025-05-29

## 2025-05-29 RX ORDER — MAGNESIUM SULFATE 1 G/100ML
1 INJECTION INTRAVENOUS ONCE
Status: COMPLETED | OUTPATIENT
Start: 2025-05-29 | End: 2025-05-29

## 2025-05-29 RX ORDER — FUROSEMIDE 10 MG/ML
40 INJECTION INTRAMUSCULAR; INTRAVENOUS
Status: DISCONTINUED | OUTPATIENT
Start: 2025-05-29 | End: 2025-05-30

## 2025-05-29 RX ADMIN — HEPARIN SODIUM 2000 UNITS: 1000 INJECTION, SOLUTION INTRAVENOUS; SUBCUTANEOUS at 06:15

## 2025-05-29 RX ADMIN — FUROSEMIDE 40 MG: 10 INJECTION, SOLUTION INTRAMUSCULAR; INTRAVENOUS at 08:42

## 2025-05-29 RX ADMIN — DIGOXIN 250 MCG: 250 INJECTION, SOLUTION INTRAMUSCULAR; INTRAVENOUS; PARENTERAL at 03:00

## 2025-05-29 RX ADMIN — HEPARIN SODIUM 14 UNITS/KG/HR: 10000 INJECTION, SOLUTION INTRAVENOUS at 23:33

## 2025-05-29 RX ADMIN — MAGNESIUM SULFATE HEPTAHYDRATE 1 G: 1 INJECTION, SOLUTION INTRAVENOUS at 06:52

## 2025-05-29 RX ADMIN — FOLIC ACID TAB 400 MCG 400 MCG: 400 TAB at 08:42

## 2025-05-29 RX ADMIN — FUROSEMIDE 40 MG: 10 INJECTION, SOLUTION INTRAVENOUS at 16:49

## 2025-05-29 RX ADMIN — METOPROLOL TARTRATE 25 MG: 25 TABLET, FILM COATED ORAL at 08:42

## 2025-05-29 RX ADMIN — METOPROLOL TARTRATE 5 MG: 1 INJECTION, SOLUTION INTRAVENOUS at 17:50

## 2025-05-29 RX ADMIN — METOPROLOL TARTRATE 50 MG: 50 TABLET, FILM COATED ORAL at 21:41

## 2025-05-29 RX ADMIN — DIGOXIN 250 MCG: 250 INJECTION, SOLUTION INTRAMUSCULAR; INTRAVENOUS; PARENTERAL at 12:10

## 2025-05-29 NOTE — ASSESSMENT & PLAN NOTE
Recent Labs     05/27/25 2007 05/28/25  0433 05/29/25  0513   MG 1.7* 2.0 1.8*     Replete as needed.

## 2025-05-29 NOTE — ASSESSMENT & PLAN NOTE
Lab Results   Component Value Date    K 3.7 05/29/2025    K 5.1 05/28/2025    K 3.3 (L) 05/28/2025     Continue to monitor and replete  Keep K > 4

## 2025-05-29 NOTE — PROGRESS NOTES
"Progress Note - Hospitalist   Name: Kourtney Jimenez 64 y.o. female I MRN: [id number]  Unit/Bed#: S -[facility] I Date of Admission: 5/27/2025   Date of Service: 5/29/2025 I Hospital Day: 2    Assessment & Plan  CHF (congestive heart failure) ([facility])  Wt Readings from Last 3 Encounters:   05/28/25 76.2 kg (168 lb)   06/08/20 67.1 kg (148 lb)   05/11/20 67.1 kg (148 lb)     Lab Results   Component Value Date     (H) 05/27/2025     Presents with increased shortness of breath and dyspnea on exertion as well as bilateral lower extremity edema for the past few days however has been symptomatic since the \"holidays\" as she is unable to physically keep up with her normal activities of daily living.  Patient is currently volume overloaded.  Investigations:    Ultrasound BLE negative for DVT.  CXR (5/27/25) - increased interstitial lung markings, suggestive of pulmonary edema  CTA PE study (5/27/25) - no PE.  Moderate right effusion with small to moderate left effusion with groundglass haziness in the lungs, correlate with CHF  CXR (5/28/25) - improving pulmonary edema and pleural effusions   Echo 5/29 revealed EF 55% and severe mitral regurgitation with posterior prolapse leaflet.    GDMT:  Diuretic: Lasix 40 mg daily IV, potential change to oral diuretics tomorrow.  B-Blocker: Lopressor 25 mg every 12 hours  ACE/ARB/ARNi: None  SGLT2i: None  Sodium restriction 2g  CMP, magnesium tomorrow a.m  Goal Mg > 2 and K > 4; Replete prn  HOB > 30°, Daily standing weights, Measure I/O  Monitor on Tele    Volume overload could be secondary to mitral regurgitation from prolapse posterior mitral leaflet.  Recommend outpatient follow-up for WILFREDO and CT surgery evaluation.  Atrial fibrillation with RVR (HCC)  POA  EKG A-fib with RVR and PVCs.  [id number] 3  Likely 2/2 alcohol abuse    Rate/Rhythm control: Lopressor 25 mg every 12 hours.  Remains uncontrolled.  Digoxin loaded with level of 2.7.  Sitter increasing dose of Lopressor " if rates remain uncontrolled.  AC: Heparin drip  Monitoring on telemetry  Monitor vital signs and ensure hemodynamically stable.  Continue to follow cardiology recommendations.  Patient is still not rate controlled.  Acute hypoxic respiratory failure (HCC)  Assessment:  Most likely etiology is CHF  Currently SpO2: 93 % on Nasal Cannula O2 Flow Rate (L/min): 2 L/min  At baseline, uses no oxygen    Plan:  Treat underlying CHF  Wean O2 as able  Pleural effusion  Likely secondary to new onset CHF.  Treated with diuresis.  Alcohol use disorder  Alcohol use: Last drink 3 days ago however was a daily drinker baseline.    Alcohol Withdrawal Prevention:  Initiate CIWA protocol and administer benzodiazepines as needed.    Arrange follow-up with primary care for ongoing management of nutritional status and electrolyte balance.  Anxiety and depression  Continue PTA antidepressants.  Hypomagnesemia  Recent Labs     05/27/25 2007 05/28/25  0433 05/29/25  0513   MG 1.7* 2.0 1.8*     Replete as needed.    VTE Pharmacologic Prophylaxis:   High Risk (Score >/= 5) - Pharmacological DVT Prophylaxis Ordered: heparin drip. Sequential Compression Devices Ordered.    Mobility:   Basic Mobility Inpatient Raw Score: 18  JH-HLM Goal: 6: Walk 10 steps or more  JH-HLM Achieved: 5: Stand (1 or more minutes)  JH-HLM Goal NOT achieved. Continue with multidisciplinary rounding and encourage appropriate mobility to improve upon JH-HLM goals.    Patient Centered Rounds: I performed bedside rounds with nursing staff today.   Discussions with Specialists or Other Care Team Provider: Cardiology    Education and Discussions with Family / Patient: Updated  () at bedside.    Current Length of Stay: 2 day(s)  Current Patient Status: Inpatient   Certification Statement: The patient will continue to require additional inpatient hospital stay due to CHF and A-fib with RVR  Discharge Plan: Anticipate discharge in 48-72 hrs to home.    Code  Status: Level 1 - Full Code    Subjective   Patient seen and examined at bedside today.  There were no overnight events.  Reports feeling better as compared to day of admission.  She is breathing well on 2 L and not experiencing shortness of breath or increased work of breathing.  She was emotional this morning mentioning to me that she is overwhelmed with her current medical condition. We discussed atrial fibrillation and its causes which in her case was most likely alcohol use.  We also discussed medication management to control her rate and anticoagulation.  We also discussed heart failure and its complications and the need for optimizing medical management to prevent progression. Otherwise denies any new symptoms.    Objective :  Temp:  [97.4 °F (36.3 °C)-98.5 °F (36.9 °C)] 98.5 °F (36.9 °C)  HR:  [] 99  BP: (107-130)/(59-93) 127/91  Resp:  [16-27] 16  SpO2:  [92 %-99 %] 93 %  O2 Device: Nasal cannula  Nasal Cannula O2 Flow Rate (L/min):  [2 L/min-4 L/min] 2 L/min    Body mass index is 28.84 kg/m².     Input and Output Summary (last 24 hours):     Intake/Output Summary (Last 24 hours) at 5/29/2025 0637  Last data filed at 5/28/2025 1801  Gross per 24 hour   Intake 120 ml   Output 1250 ml   Net -1130 ml       Physical Exam  Constitutional:       Appearance: Normal appearance.   HENT:      Head: Normocephalic and atraumatic.     Eyes:      Extraocular Movements: Extraocular movements intact.      Conjunctiva/sclera: Conjunctivae normal.      Pupils: Pupils are equal, round, and reactive to light.       Cardiovascular:      Rate and Rhythm: Tachycardia present. Rhythm irregular.      Pulses: Normal pulses.      Heart sounds: Normal heart sounds. No murmur heard.     No gallop.   Pulmonary:      Effort: Pulmonary effort is normal. No respiratory distress.      Breath sounds: Normal breath sounds. No wheezing.   Abdominal:      General: Bowel sounds are normal. There is no distension.      Palpations: Abdomen  is soft.      Tenderness: There is no abdominal tenderness.     Musculoskeletal:      Right lower leg: Edema present.      Left lower leg: Edema present.     Skin:     Capillary Refill: Capillary refill takes less than 2 seconds.     Neurological:      General: No focal deficit present.      Mental Status: She is alert and oriented to person, place, and time. Mental status is at baseline.     Psychiatric:         Mood and Affect: Mood normal.         Behavior: Behavior normal.           Telemetry:  Telemetry Orders (From admission, onward)               24 Hour Telemetry Monitoring  Continuous x 24 Hours (Telem)        Expiring   Question:  Reason for 24 Hour Telemetry  Answer:  Arrhythmias requiring acute medical intervention / PPM or ICD malfunction                     Telemetry Reviewed: Atrial fibrillation. HR averaging 140  Indication for Continued Telemetry Use: Acute CHF on >200 mg lasix/day or equivalent dose or with new reduced EF.                Lab Results: I have reviewed the following results:   Results from last 7 days   Lab Units 05/29/25  0513 05/28/25  0433   WBC Thousand/uL 3.27* 4.07*   HEMOGLOBIN g/dL 13.1 11.8   HEMATOCRIT % 40.5 37.7   PLATELETS Thousands/uL 120* 102*   SEGS PCT %  --  60   LYMPHO PCT %  --  23   MONO PCT %  --  11   EOS PCT %  --  4     Results from last 7 days   Lab Units 05/29/25  0513 05/28/25  1227 05/28/25  0433   SODIUM mmol/L 137   < > 139   POTASSIUM mmol/L 3.7   < > 3.3*   CHLORIDE mmol/L 105   < > 106   CO2 mmol/L 25   < > 21   BUN mg/dL 12   < > 9   CREATININE mg/dL 0.55*   < > 0.53*   ANION GAP mmol/L 7   < > 12   CALCIUM mg/dL 8.2*   < > 7.8*   ALBUMIN g/dL  --   --  3.4*   TOTAL BILIRUBIN mg/dL  --   --  1.78*   ALK PHOS U/L  --   --  61   ALT U/L  --   --  43   AST U/L  --   --  33   GLUCOSE RANDOM mg/dL 85   < > 75    < > = values in this interval not displayed.     Results from last 7 days   Lab Units 05/27/25  1902   INR  1.16             Results from last 7  days   Lab Units 05/27/25 2007   LACTIC ACID mmol/L 1.2       Recent Cultures (last 7 days):               Last 24 Hours Medication List:     Current Facility-Administered Medications:     digoxin (LANOXIN) injection 250 mcg, Q8H    folic acid (FOLVITE) tablet 400 mcg, Daily    furosemide (LASIX) injection 40 mg, Daily    heparin (porcine) 25,000 units in 0.45% NaCl 250 mL infusion (premix), Titrated, Last Rate: 14 Units/kg/hr (05/29/25 0615)    heparin (porcine) injection 2,000 Units, Q6H PRN    heparin (porcine) injection 4,000 Units, Q6H PRN    magnesium sulfate IVPB (premix) SOLN 1 g, Once    metoprolol tartrate (LOPRESSOR) tablet 25 mg, Q12H VIKTORIA    [START ON 5/31/2025] thiamine tablet 100 mg, Daily    Administrative Statements   Today, Patient Was Seen By: Jared Duque MD      **Please Note: This note may have been constructed using a voice recognition system.**

## 2025-05-29 NOTE — ASSESSMENT & PLAN NOTE
POA  EKG A-fib with RVR and PVCs.  [id number] 3  Likely 2/2 alcohol abuse    Rate/Rhythm control: Lopressor 25 mg every 12 hours.  Remains uncontrolled.  Digoxin loaded with level of 2.7.  Sitter increasing dose of Lopressor if rates remain uncontrolled.  AC: Heparin drip  Monitoring on telemetry  Monitor vital signs and ensure hemodynamically stable.  Continue to follow cardiology recommendations.  Patient is still not rate controlled.

## 2025-05-29 NOTE — ASSESSMENT & PLAN NOTE
"Wt Readings from Last 3 Encounters:   05/28/25 76.2 kg (168 lb)   06/08/20 67.1 kg (148 lb)   05/11/20 67.1 kg (148 lb)     Lab Results   Component Value Date     (H) 05/27/2025     Presents with increased shortness of breath and dyspnea on exertion as well as bilateral lower extremity edema for the past few days however has been symptomatic since the \"holidays\" as she is unable to physically keep up with her normal activities of daily living.  Patient is currently volume overloaded.  Investigations:    Ultrasound BLE negative for DVT.  CXR (5/27/25) - increased interstitial lung markings, suggestive of pulmonary edema  CTA PE study (5/27/25) - no PE.  Moderate right effusion with small to moderate left effusion with groundglass haziness in the lungs, correlate with CHF  CXR (5/28/25) - improving pulmonary edema and pleural effusions   Echo 5/29 revealed EF 55% and severe mitral regurgitation with posterior prolapse leaflet.    GDMT:  Diuretic: Lasix 40 mg daily IV, potential change to oral diuretics tomorrow.  B-Blocker: Lopressor 25 mg every 12 hours  ACE/ARB/ARNi: None  SGLT2i: None  Sodium restriction 2g  CMP, magnesium tomorrow a.m  Goal Mg > 2 and K > 4; Replete prn  HOB > 30°, Daily standing weights, Measure I/O  Monitor on Tele    Volume overload could be secondary to mitral regurgitation from prolapse posterior mitral leaflet.  Recommend outpatient follow-up for WILFREDO and CT surgery evaluation.  "

## 2025-05-29 NOTE — OCCUPATIONAL THERAPY NOTE
Occupational Therapy Evaluation     Patient Name: Kourtney Jimenez  Today's Date: 5/29/2025  Problem List  Principal Problem:    CHF (congestive heart failure) ([facility])  Active Problems:    Anxiety and depression    Atrial fibrillation with RVR (HCC)    Pleural effusion    Alcohol use disorder    Acute hypoxic respiratory failure (HCC)    Hypokalemia    Hypomagnesemia    Past Medical History  Past Medical History[1]  Past Surgical History  Past Surgical History[2]        05/29/25 5369   OT Last Visit   OT Visit Date 05/29/25   Note Type   Note type Evaluation   Pain Assessment   Pain Assessment Tool 0-10   Pain Score No Pain   Hospital Pain Intervention(s) Repositioned;Ambulation/increased activity;Emotional support   Restrictions/Precautions   Weight Bearing Precautions Per Order No   Other Precautions Chair Alarm;Bed Alarm;Multiple lines;Telemetry;O2;Fall Risk   Home Living   Type of Home House  (3 townhouse 1 MICHI)   Home Layout Multi-level;Performs ADLs on one level;Able to live on main level with bedroom/bathroom;Access   Bathroom Shower/Tub Walk-in shower   Bathroom Toilet Standard   Bathroom Equipment Grab bars in shower;Shower chair   Bathroom Accessibility Accessible   Home Equipment Grab bars   Additional Comments Pt reports living with her  in a 3 story townhouse with 1 MICHI reporting that her main bathroom and bedroom is on the first floor; no DME PTA   Prior Function   Level of [place] Independent with ADLs;Independent with functional mobility;Independent with IADLS   Lives With Spouse   Receives Help From Family   IADLs Independent with driving;Independent with meal prep;Independent with medication management   Falls in the last 6 months 0   Vocational Retired   Comments (+) driving; reports that her  works however is home most mornings and that they share IADL tasks   Lifestyle   Autonomy PTA, pt was independent in ADLs, shares IADLs with her , and uses no DME for functional  "mobility; (+) driving   Reciprocal Relationships Lives with her    Service to Others Retired reporting previously working in retail   Intrinsic Gratification Enjoys watching TV   Subjective   Subjective \"I would like to brush my teeth and use the bathroom.\"   ADL   Where Assessed Standing at sink   Eating Assistance 7  Independent   Grooming Assistance 7  Independent   UB Bathing Assistance 6  Modified Independent   LB Bathing Assistance 5  Supervision/Setup   UB Dressing Assistance 6  Modified independent   LB Dressing Assistance 5  Supervision/Setup   Toileting Assistance  6  Modified independent   Functional Assistance 5  Supervision/Setup   Bed Mobility   Supine to Sit 6  Modified independent   Additional items HOB elevated;Bedrails;Increased time required;Verbal cues   Sit to Supine Unable to assess   Additional Comments At end of session, pt left sitting upright in recliner with all functional needs in reach with chair alarm activated   Transfers   Sit to Stand 6  Modified independent   Additional items Increased time required   Stand to Sit 6  Modified independent   Additional items Increased time required   Toilet transfer 6  Modified independent   Additional items Increased time required;Verbal cues;Standard toilet   Additional Comments w/ no DME   Functional Mobility   Functional Mobility 5  Supervision   Additional Comments Pt completed household functional mobility <> bathroom @ supervision level w/ no DME; during functional mobility and when standing @ sink to complete standing ADLs, noted pt's HR 152bpm in which pt educated to take seated rest break, RN made aware; @ end of session, pt's HR flucuating around 120s   Balance   Static Sitting Good   Dynamic Sitting Fair +   Static Standing Fair +   Dynamic Standing Fair   Ambulatory Fair   Activity Tolerance   Activity Tolerance Patient tolerated treatment well;Patient limited by fatigue   Nurse Made Aware RN cleared and updated after   RUE " Assessment   RUE Assessment WFL   LUE Assessment   LUE Assessment WFL   Hand Function   Gross Motor Coordination Functional   Fine Motor Coordination Functional   Cognition   Overall Cognitive Status WFL   Arousal/Participation Alert;Responsive;Arousable;Cooperative   Attention Within functional limits   Orientation Level Oriented X4   Memory Within functional limits   Following Commands Follows one step commands without difficulty   Comments Pt pleasant and cooperative   Assessment   Prognosis Fair   Assessment Pt is a 63 yo female who presented to [facility] from Urgent Care with 3 days of feeling unwell, cough, SOB, and fatigue in which pt found with CHF and A-fib w/ RVR. Pt  has no past medical history on file. Pt with active OT orders and OT consulted to assess pt's functional status and occupational performance to determine safe d/c needs. Pt lives with her  in a 3 story townhouse with 1 MICHI. PTA, pt was independent in ADLs, shares IADLs with her , and uses no DME for functional mobility. (+) driving. Discussed role and scope of OT in which pt was receptive. At this time, pt is not demonstrating any significant occupational deficits and is functioning at a level of bed mobiliy @ Mod I level, functional transfers @ Mod I level w/ no DME, functional mobility @ supervision level w/ no DME, UB ADLs @ Mod I level, and LB ADLs @ supervision level. From an OT standpoint, recommend discharge to home with increased support once medically stable. Pt was receptive regarding education on returning home safely with energy conservation techniques and demonstrated good carryover during occupational/functional performance. At this time, pt demonstrates good insight/safety awareness and does not express any concerns regarding performing ADL/IADL/functional mobility tasks. No further skilled acute care OT services are needed at this time. The patient's raw score on the AM-PAC Daily Activity Inpatient  Short Form is 23. A raw score of greater than or equal to 19 suggests the patient may benefit from discharge to home. Please refer to the recommendation of the Occupational Therapist for safe discharge planning.  Recommend continued engagement in ADL/functional mobility tasks with nursing and restorative therapy staff as appropriate to promote the highest level of independence prior to discharge. OT is discharging pt from caseload at this time, please reconsult if needed.   Goals   Patient Goals To go home   Plan   OT Frequency Eval only   Discharge Recommendation   Rehab Resource Intensity Level, OT No post-acute rehabilitation needs  (Increased support/assistance @ home upon d/c)   AM-PAC Daily Activity Inpatient   Lower Body Dressing 3   Bathing 4   Toileting 4   Upper Body Dressing 4   Grooming 4   Eating 4   Daily Activity Raw Score 23   Daily Activity Standardized Score (Calc for Raw Score >=11) 51.12   AM-PAC Applied Cognition Inpatient   Following a Speech/Presentation 4   Understanding Ordinary Conversation 4   Taking Medications 4   Remembering Where Things Are Placed or Put Away 4   Remembering List of 4-5 Errands 4   Taking Care of Complicated Tasks 4   Applied Cognition Raw Score 24   Applied Cognition Standardized Score 62.21   End of Consult   Education Provided Yes   Patient Position at End of Consult Bedside chair;Bed/Chair alarm activated;All needs within reach   Nurse Communication Nurse aware of consult     Naty Chandler MS, OTR/L         [1] No past medical history on file.  [2]   Past Surgical History:  Procedure Laterality Date    WISDOM TOOTH EXTRACTION

## 2025-05-29 NOTE — PLAN OF CARE
Problem: PAIN - ADULT  Goal: Verbalizes/displays adequate comfort level or baseline comfort level  Description: Interventions:  - Encourage patient to monitor pain and request assistance  - Assess pain using appropriate pain scale  - Administer analgesics as ordered based on type and severity of pain and evaluate response  - Implement non-pharmacological measures as appropriate and evaluate response  - Consider cultural and social influences on pain and pain management  - Notify physician/advanced practitioner if interventions unsuccessful or patient reports new pain  - Educate patient/family on pain management process including their role and importance of  reporting pain   - Provide non-pharmacologic/complimentary pain relief interventions  Outcome: Progressing     Problem: INFECTION - ADULT  Goal: Absence or prevention of progression during hospitalization  Description: INTERVENTIONS:  - Assess and monitor for signs and symptoms of infection  - Monitor lab/diagnostic results  - Monitor all insertion sites, i.e. indwelling lines, tubes, and drains  - Monitor endotracheal if appropriate and nasal secretions for changes in amount and color  - [place] appropriate cooling/warming therapies per order  - Administer medications as ordered  - Instruct and encourage patient and family to use good hand hygiene technique  - Identify and instruct in appropriate isolation precautions for identified infection/condition  Outcome: Progressing  Goal: Absence of fever/infection during neutropenic period  Description: INTERVENTIONS:  - Monitor WBC  - Perform strict hand hygiene  - Limit to healthy visitors only  - No plants, dried, fresh or silk flowers with alan in patient room  Outcome: Progressing     Problem: Knowledge Deficit  Goal: Patient/family/caregiver demonstrates understanding of disease process, treatment plan, medications, and discharge instructions  Description: Complete learning assessment and assess knowledge  base.  Interventions:  - Provide teaching at level of understanding  - Provide teaching via preferred learning methods  Outcome: Progressing     Problem: Nutrition/Hydration-ADULT  Goal: Nutrient/Hydration intake appropriate for improving, restoring or maintaining nutritional needs  Description: Monitor and assess patient's nutrition/hydration status for malnutrition. Collaborate with interdisciplinary team and initiate plan and interventions as ordered.  Monitor patient's weight and dietary intake as ordered or per policy. Utilize nutrition screening tool and intervene as necessary. Determine patient's food preferences and provide high-protein, high-caloric foods as appropriate.     INTERVENTIONS:  - Monitor oral intake, urinary output, labs, and treatment plans  - Assess nutrition and hydration status and recommend course of action  - Evaluate amount of meals eaten  - Assist patient with eating if necessary   - Allow adequate time for meals  - Recommend/ encourage appropriate diets, oral nutritional supplements, and vitamin/mineral supplements  - Order, calculate, and assess calorie counts as needed  - Recommend, monitor, and adjust tube feedings and TPN/PPN based on assessed needs  - Assess need for intravenous fluids  - Provide specific nutrition/hydration education as appropriate  - Include patient/family/caregiver in decisions related to nutrition  Outcome: Progressing     Problem: CARDIOVASCULAR - ADULT  Goal: Maintains optimal cardiac output and hemodynamic stability  Description: INTERVENTIONS:  - Monitor I/O, vital signs and rhythm  - Monitor for S/S and trends of decreased cardiac output  - Administer and titrate ordered vasoactive medications to optimize hemodynamic stability  - Assess quality of pulses, skin color and temperature  - Assess for signs of decreased coronary artery perfusion  - Instruct patient to report change in severity of symptoms  Outcome: Progressing  Goal: Absence of cardiac  dysrhythmias or at baseline rhythm  Description: INTERVENTIONS:  - Continuous cardiac monitoring, vital signs, obtain 12 lead EKG if ordered  - Administer antiarrhythmic and heart rate control medications as ordered  - Monitor electrolytes and administer replacement therapy as ordered  Outcome: Progressing

## 2025-05-29 NOTE — ASSESSMENT & PLAN NOTE
Assessment:  Most likely etiology is CHF  Currently SpO2: 93 % on Nasal Cannula O2 Flow Rate (L/min): 2 L/min  At baseline, uses no oxygen    Plan:  Treat underlying CHF  Wean O2 as able

## 2025-05-29 NOTE — ASSESSMENT & PLAN NOTE
Has been having palpitations intermittently in the past  Presented with new onset Afib HR 150s-160s  Likely secondary to alcohol use  Likely precipitated CHF exacerbation  Initially received IV Cardizem 15 mg and was subsequently put on IV Cardizem gtt  Transitioned to IV metoprolol 2.5 mg q6h  [id number] score 3  Currently on heparin drip  Received IV digoxin load    Plan:  Increase to Lopressor 50 mg BID for better rate control  Can transition from heparin gtt to DOAC  Monitor on telemetry

## 2025-05-29 NOTE — PHYSICAL THERAPY NOTE
PHYSICAL THERAPY EVALUATION  DATE: 05/29/25  TIME: 1524-1539    NAME:  Kourtney Jimenez  AGE:   64 y.o.  Mrn:   [id number]  Length Of Stay: 2    ADMIT DX:  CHF (congestive heart failure) ([facility]) [I50.9]  A-fib ([facility]) [I48.91]  Chest pain [R07.9]  Pleural effusion [J90]    Past Medical History[1]  Past Surgical History[2]    Performed at least 2 patient identifiers during session: Name and ID bracelet     05/29/25 1891   PT Last Visit   PT Visit Date 05/29/25   Note Type   Note type Evaluation   Pain Assessment   Pain Assessment Tool 0-10   Pain Score No Pain   Restrictions/Precautions   Weight Bearing Precautions Per Order No   Other Precautions Multiple lines;Telemetry  (presenting on 2L O2 via NC (however removed from pt's nose upon therapist arrival, pt saturating 97% RA); IV; dru; pt has signed chair/bed alarm refusal)   Home Living   Type of Home House   Home Layout Multi-level;Stairs to enter without rails  (1STE with no HR (reports unable to install one due to DIANA), maintains single level living)   Bathroom Shower/Tub Walk-in shower   Bathroom Toilet Standard   Bathroom Equipment Grab bars in shower;Shower chair   Bathroom Accessibility Accessible   Home Equipment Grab bars   Prior Function   Level of [place] Independent with ADLs;Independent with functional mobility;Independent with IADLS   Lives With Spouse   Receives Help From Family   IADLs Independent with driving;Independent with meal prep;Independent with medication management   Falls in the last 6 months 0   Comments Pt reports that at baseline she is fully independent with all aspects of self care and functional mobility of household and community distances with no AD, completes all IADLs. Has good support from spouse however he works outside the home during the daytime.   General   Additional Pertinent History Pt is a 64 yr old female admitted 5/27/25 w/ tachycardia, chest pain, SOB, LLE edema (duplex negative for DVT). Dx: CHF exacerbation.  "PMH includes anxiety, depression, alcohol abuse.   Family/Caregiver Present No   Cognition   Overall Cognitive Status WFL   Arousal/Participation Cooperative   Attention Within functional limits   Orientation Level Oriented X4   Memory Within functional limits   Following Commands Follows multistep commands with increased time or repetition   Subjective   Subjective \"I get annoyed with all these wires.\"   RUE Assessment   RUE Assessment WFL   LUE Assessment   LUE Assessment WFL   RLE Assessment   RLE Assessment WFL   LLE Assessment   LLE Assessment WFL   Coordination   Movements are Fluid and Coordinated 1   Sensation WFL   Light Touch   RLE Light Touch Grossly intact   LLE Light Touch Grossly intact   Proprioception   RLE Proprioception Grossly intact   LLE Proprioception Grossly Intact   Bed Mobility   Additional Comments Bed mobility NT on this date as pt presents seated at EOB and was left seated OOB in recliner chair. Pt reports completing bed mobility independently earlier in day w/o issue.   Transfers   Sit to Stand 7  Independent   Stand to Sit 7  Independent   Stand pivot 7  Independent   Toilet transfer 7  Independent   Additional Comments No AD/DME needed during transfers. Pt denies lightheadedness / dizziness with changes in positioning. PT safe and steady with no UE support.   Ambulation/Elevation   Gait pattern WNL;Decreased foot clearance;Short stride;Step through pattern   Gait Assistance 5  Supervision   Additional items Assist x 1;Verbal cues   Assistive Device None  (u/l UE support on IV pole // pt self management of IV pole assisted through ambulation trial)   Distance 300ft with change in direction assisted   Stair Management Assistance Not tested  (pt has 1 MICHI her home, maintains FFSU, denies concerns about elevations completion)   Balance   Static Sitting Good   Dynamic Sitting Fair +   Static Standing Fair +   Dynamic Standing Fair   Ambulatory Fair   Endurance Deficit   Endurance Deficit Yes " "  Activity Tolerance   Activity Tolerance Patient tolerated treatment well;Other (Comment)  (tachycardia)   Medical Staff Made Aware Spoke with RN, OT   Assessment   Prognosis Good   Assessment Pt seen for PT evaluation for mobility assessment & discharge needs. Pt admitted 5/27/2025 w/ tachycardia, chest pain, SOB, LLE edema, dx CHF exacerbation (HCC). During PT IE, pt independently completes transfers with no AD, ambulates 18ft + additional 300ft with no AD and S. Pt denies SOB or chest pain with mobility; however pt's HR tachy into 150s and resolves with functional rest (asymptomatic t/o). Pt displays above outlined functional impairments & limitations, and presents close to her baseline level of functional mobility. The AM-PAC & Barthel Index outcome tools were used to assist in determining pt safety w/ mobility/self care & appropriate d/c recommendations, see above for scores. Pt is at risk of falls d/t multiple comorbidities, impaired balance, acuity of medical illness, ongoing medical treatment of primary dx, polypharmacy, and unstable vitals. Pt's clinical presentation is currently unstable/unpredictable as seen in pt's presentation of vital sign response and new onset of impairment of functional mobility. Based on pt presentation & limited functional impairments, pt would most appropriately benefit from return to home with family support upon d/c, no additional skilled PT services anticipated at this time. Recommend frequent bouts of supervised ambulation during remainder of her stay in order to combat hospital related deconditioning.   Goals   Patient Goals \"to go home\"   PT Treatment Day 0   Discharge Recommendation   Rehab Resource Intensity Level, PT No post-acute rehabilitation needs   AM-PAC Basic Mobility Inpatient   Turning in Flat Bed Without Bedrails 4   Lying on Back to Sitting on Edge of Flat Bed Without Bedrails 4   Moving Bed to Chair 4   Standing Up From Chair Using Arms 4   Walk in Room 3 "   Climb 3-5 Stairs With Railing 3   Basic Mobility Inpatient Raw Score 22   Basic Mobility Standardized Score 47.4   [facility] Highest Level Of Mobility   -HLM Goal 7: Walk 25 feet or more   JH-HLM Achieved 8: Walk 250 feet ot more   Modified Antione Scale   Modified Antione Scale 2   Barthel Index   Feeding 10   Bathing 5   Grooming Score 5   Dressing Score 10   Bladder Score 10   Bowels Score 10   Toilet Use Score 10   Transfers (Bed/Chair) Score 15   Mobility (Level Surface) Score 10   Stairs Score 5   Barthel Index Score 90   End of Consult   Patient Position at End of Consult Bedside chair;All needs within reach  (pt has signed chair/bed alarm refusal)       Based on patient's [facility] Highest Level of Mobility scores today, patient currently has a goal of -[facility] Levels: 8: WALK 250 FEET OR MORE, to be completed with RN staffing each shift, in order to improve overall activity tolerance and mobility, combat hospital related deconditioning, and maximize outcomes for d/c from the acute care setting.     The patient's AM-PAC Basic Mobility Inpatient Short Form Raw Score is 22. A Raw score of greater than 16 suggests the patient may benefit from discharge to home. Please also refer to the recommendation of the Physical Therapist for safe discharge planning.        Emely Mcneil PT, DPT   Available via [employer]  NPI # [id number]  [place] License - [id number]  5/29/2025          [1] No past medical history on file.  [2]   Past Surgical History:  Procedure Laterality Date    WISDOM TOOTH EXTRACTION

## 2025-05-29 NOTE — ASSESSMENT & PLAN NOTE
Lab Results   Component Value Date    MG 1.8 (L) 05/29/2025    MG 2.0 05/28/2025    MG 1.7 (L) 05/27/2025     Continue to monitor and replete  Keep Mg > 2

## 2025-05-29 NOTE — ASSESSMENT & PLAN NOTE
Wt Readings from Last 3 Encounters:   05/28/25 76.2 kg (168 lb)   06/08/20 67.1 kg (148 lb)   05/11/20 67.1 kg (148 lb)   Presented with cough, SOB, fatigue, and BLE edema for the past 3 days  Has been having CARBAJAL for the past month especially with going up stairs    CXR (5/27/25) - increased interstitial lung markings, suggestive of pulmonary edema  CTA PE study (5/27/25) - no PE.  Moderate right effusion with small to moderate left effusion with groundglass haziness in the lungs, correlate with CHF  CXR (5/28/25) - improving pulmonary edema and pleural effusions  With acute hypoxic respiratory failure.  Required BiPAP and ICU admission for hypoxia and increased work of breathing.  Downgraded from ICU on 5/28  Symptoms improved with IV diuretics  Echo (5/28/25) - EF 55%.  Unable to assess diastolic function due to Afib.  Left atrium is moderately dilated.  Severe mitral regurgitation likely due to posterior mitral valve leaflet prolapse.  Mild TR.  Recommend WILFREDO for further evaluation estimated RVSP is 34.00 mmHg.    Plan:  Continue increased Lopressor 50 mg BID dose  Continue IV Lasix 40 mg BID  Will likely transition to oral diuretic tomorrow  Can get WILFREDO as outpatient  Will need referral to CT surgery as outpatient for mitral valve repair  Follow up with general cardiology in the outpatient setting  Daily weights  Daily I/O's  Keep K > 4, Mg > 2

## 2025-05-29 NOTE — PROGRESS NOTES
Progress Note - Cardiology   Name: Kourtney Jimenez 64 y.o. female I MRN: [id number]  Unit/Bed#: S -01 I Date of Admission: 5/27/2025   Date of Service: 5/29/2025 I Hospital Day: 2    Assessment & Plan  CHF exacerbation (HCC)  Wt Readings from Last 3 Encounters:   05/28/25 76.2 kg (168 lb)   06/08/20 67.1 kg (148 lb)   05/11/20 67.1 kg (148 lb)   Presented with cough, SOB, fatigue, and BLE edema for the past 3 days  Has been having CARBAJAL for the past month especially with going up stairs    CXR (5/27/25) - increased interstitial lung markings, suggestive of pulmonary edema  CTA PE study (5/27/25) - no PE.  Moderate right effusion with small to moderate left effusion with groundglass haziness in the lungs, correlate with CHF  CXR (5/28/25) - improving pulmonary edema and pleural effusions  With acute hypoxic respiratory failure.  Required BiPAP and ICU admission for hypoxia and increased work of breathing.  Downgraded from ICU on 5/28  Symptoms improved with IV diuretics  Echo (5/28/25) - EF 55%.  Unable to assess diastolic function due to Afib.  Left atrium is moderately dilated.  Severe mitral regurgitation likely due to posterior mitral valve leaflet prolapse.  Mild TR.  Recommend WILFREDO for further evaluation estimated RVSP is 34.00 mmHg.    Plan:  Continue increased Lopressor 50 mg BID dose  Continue IV Lasix 40 mg BID  Will likely transition to oral diuretic tomorrow  Can get WILFREDO as outpatient  Will need referral to CT surgery as outpatient for mitral valve repair  Follow up with general cardiology in the outpatient setting  Daily weights  Daily I/O's  Keep K > 4, Mg > 2  Atrial fibrillation with RVR (HCC)  Has been having palpitations intermittently in the past  Presented with new onset Afib HR 150s-160s  Likely secondary to alcohol use  Likely precipitated CHF exacerbation  Initially received IV Cardizem 15 mg and was subsequently put on IV Cardizem gtt  Transitioned to IV metoprolol 2.5 mg  q6h  [id number] score 3  Currently on heparin drip  Received IV digoxin load    Plan:  Increase to Lopressor 50 mg BID for better rate control  Can transition from heparin gtt to DOAC  Monitor on telemetry  Acute hypoxic respiratory failure (HCC)  Required BIPAP and ICU admission  Secondary to CHF exacerbation  Currently on RA  Anxiety and depression  No longer taking Lexapro  Alcohol use disorder  Drinks 1-2 glasses of wine daily.  Sometimes more.  For the past 20 years  Last drink on 5/24  Received phenobarbital in the ED  CIWA protocol  Per primary team  Hypokalemia  Lab Results   Component Value Date    K 3.7 05/29/2025    K 5.1 05/28/2025    K 3.3 (L) 05/28/2025     Continue to monitor and replete  Keep K > 4  Hypomagnesemia  Lab Results   Component Value Date    MG 1.8 (L) 05/29/2025    MG 2.0 05/28/2025    MG 1.7 (L) 05/27/2025     Continue to monitor and replete  Keep Mg > 2    Subjective   Patient was seen and examined at bedside.  She had no chest pain, difficulty breathing, lightheadedness, palpitations.  She is urinating well.  She was not on any supplemental oxygenation at the time of my examination.    Objective :  Temp:  [98.1 °F (36.7 °C)-99.2 °F (37.3 °C)] 99.2 °F (37.3 °C)  HR:  [] 123  BP: (121-136)/() 129/99  Resp:  [16-27] 16  SpO2:  [92 %-99 %] 97 %  O2 Device: Nasal cannula  Nasal Cannula O2 Flow Rate (L/min):  [2 L/min] 2 L/min  Orthostatic Blood Pressures      Flowsheet Row Most Recent Value   Blood Pressure 129/99 filed at 05/29/2025 1517   Patient Position - Orthostatic VS Lying filed at 05/28/2025 1107          First Weight: Weight - Scale: 75 kg (165 lb 5.5 oz) (05/27/25 1333)  Vitals:    05/28/25 0534 05/28/25 1814   Weight: 76.5 kg (168 lb 10.4 oz) 76.2 kg (168 lb)     Physical Exam  Constitutional:       General: She is not in acute distress.     Appearance: Normal appearance. She is not ill-appearing or toxic-appearing.   HENT:      Head: Normocephalic and atraumatic.  "    Eyes:      Extraocular Movements: Extraocular movements intact.       Cardiovascular:      Rate and Rhythm: Tachycardia present. Rhythm irregular.      Heart sounds: No murmur heard.  Pulmonary:      Effort: No respiratory distress.      Breath sounds: No wheezing, rhonchi or rales.      Comments: Decreased breath sounds  Abdominal:      General: There is no distension.      Tenderness: There is no abdominal tenderness. There is no guarding or rebound.     Musculoskeletal:         General: No swelling. Normal range of motion.      Right lower leg: No edema.      Left lower leg: No edema.     Skin:     General: Skin is warm.      Capillary Refill: Capillary refill takes less than 2 seconds.      Findings: No erythema or rash.     Neurological:      General: No focal deficit present.      Mental Status: She is alert and oriented to person, place, and time. Mental status is at baseline.         Lab Results: I have reviewed the following results:  Results from last 7 days   Lab Units 05/29/25 0513 05/28/25  0433 05/27/25 2007   WBC Thousand/uL 3.27* 4.07* 4.91   HEMOGLOBIN g/dL 13.1 11.8 12.7   HEMATOCRIT % 40.5 37.7 38.9   PLATELETS Thousands/uL 120* 102* 124*     Results from last 7 days   Lab Units 05/29/25  0513 05/28/25  1227 05/28/25  0433   POTASSIUM mmol/L 3.7 5.1 3.3*   CHLORIDE mmol/L 105 103 106   CO2 mmol/L 25 23 21   BUN mg/dL 12 10 9   CREATININE mg/dL 0.55* 0.54* 0.53*   CALCIUM mg/dL 8.2* 8.5 7.8*     Results from last 7 days   Lab Units 05/29/25  1158 05/29/25  0513 05/28/25  1008 05/28/25  0221 05/27/25  1902   INR   --   --   --   --  1.16   PTT seconds 70* 49* 77*   < > 27    < > = values in this interval not displayed.     No results found for: \"HGBA1C\"  No results found for: \"CKTOTAL\", \"CKMB\", \"CKMBINDEX\", \"TROPONINI\"    Imaging Results Review: I reviewed radiology reports from this admission including: chest xray, CT chest, and Ultrasound(s).  Other Study Results Review: EKG was reviewed. "     VTE Pharmacologic Prophylaxis: VTE covered by:  heparin (porcine), Intravenous, 14 Units/kg/hr at 05/29/25 1158  heparin (porcine), Intravenous, 2,000 Units at 05/29/25 0615  heparin (porcine), Intravenous      VTE Mechanical Prophylaxis: sequential compression device

## 2025-05-29 NOTE — ASSESSMENT & PLAN NOTE
Alcohol use: Last drink 3 days ago however was a daily drinker baseline.    Alcohol Withdrawal Prevention:  Initiate CIWA protocol and administer benzodiazepines as needed.    Arrange follow-up with primary care for ongoing management of nutritional status and electrolyte balance.

## 2025-05-30 PROBLEM — J96.01 ACUTE HYPOXIC RESPIRATORY FAILURE (HCC): Status: RESOLVED | Noted: 2025-05-28 | Resolved: 2025-05-30

## 2025-05-30 LAB
ANION GAP SERPL CALCULATED.3IONS-SCNC: 8 MMOL/L (ref 4–13)
APTT PPP: 102 SECONDS (ref 23–34)
APTT PPP: 73 SECONDS (ref 23–34)
BASOPHILS # BLD AUTO: 0.03 THOUSANDS/ÂΜL (ref 0–0.1)
BASOPHILS NFR BLD AUTO: 1 % (ref 0–1)
BUN SERPL-MCNC: 6 MG/DL (ref 5–25)
CALCIUM SERPL-MCNC: 8.1 MG/DL (ref 8.4–10.2)
CHLORIDE SERPL-SCNC: 102 MMOL/L (ref 96–108)
CO2 SERPL-SCNC: 29 MMOL/L (ref 21–32)
CREAT SERPL-MCNC: 0.49 MG/DL (ref 0.6–1.3)
EOSINOPHIL # BLD AUTO: 0.35 THOUSAND/ÂΜL (ref 0–0.61)
EOSINOPHIL NFR BLD AUTO: 10 % (ref 0–6)
ERYTHROCYTE [DISTWIDTH] IN BLOOD BY AUTOMATED COUNT: 13.2 % (ref 11.6–15.1)
GFR SERPL CREATININE-BSD FRML MDRD: 102 ML/MIN/1.73SQ M
GLUCOSE SERPL-MCNC: 78 MG/DL (ref 65–140)
HCT VFR BLD AUTO: 41.9 % (ref 34.8–46.1)
HGB BLD-MCNC: 13.9 G/DL (ref 11.5–15.4)
IMM GRANULOCYTES # BLD AUTO: 0.01 THOUSAND/UL (ref 0–0.2)
IMM GRANULOCYTES NFR BLD AUTO: 0 % (ref 0–2)
LYMPHOCYTES # BLD AUTO: 1.06 THOUSANDS/ÂΜL (ref 0.6–4.47)
LYMPHOCYTES NFR BLD AUTO: 31 % (ref 14–44)
MAGNESIUM SERPL-MCNC: 1.8 MG/DL (ref 1.9–2.7)
MCH RBC QN AUTO: 33.9 PG (ref 26.8–34.3)
MCHC RBC AUTO-ENTMCNC: 33.2 G/DL (ref 31.4–37.4)
MCV RBC AUTO: 102 FL (ref 82–98)
MONOCYTES # BLD AUTO: 0.54 THOUSAND/ÂΜL (ref 0.17–1.22)
MONOCYTES NFR BLD AUTO: 16 % (ref 4–12)
NEUTROPHILS # BLD AUTO: 1.46 THOUSANDS/ÂΜL (ref 1.85–7.62)
NEUTS SEG NFR BLD AUTO: 42 % (ref 43–75)
NRBC BLD AUTO-RTO: 0 /100 WBCS
PHOSPHATE SERPL-MCNC: 4.1 MG/DL (ref 2.3–4.1)
PLATELET # BLD AUTO: 158 THOUSANDS/UL (ref 149–390)
PMV BLD AUTO: 10.4 FL (ref 8.9–12.7)
POTASSIUM SERPL-SCNC: 3.2 MMOL/L (ref 3.5–5.3)
RBC # BLD AUTO: 4.1 MILLION/UL (ref 3.81–5.12)
SODIUM SERPL-SCNC: 139 MMOL/L (ref 135–147)
WBC # BLD AUTO: 3.45 THOUSAND/UL (ref 4.31–10.16)

## 2025-05-30 PROCEDURE — 85730 THROMBOPLASTIN TIME PARTIAL: CPT | Performed by: INTERNAL MEDICINE

## 2025-05-30 PROCEDURE — 99232 SBSQ HOSP IP/OBS MODERATE 35: CPT | Performed by: INTERNAL MEDICINE

## 2025-05-30 PROCEDURE — 80048 BASIC METABOLIC PNL TOTAL CA: CPT

## 2025-05-30 PROCEDURE — 83735 ASSAY OF MAGNESIUM: CPT

## 2025-05-30 PROCEDURE — 85025 COMPLETE CBC W/AUTO DIFF WBC: CPT

## 2025-05-30 PROCEDURE — 84100 ASSAY OF PHOSPHORUS: CPT

## 2025-05-30 RX ORDER — POTASSIUM CHLORIDE 1500 MG/1
40 TABLET, EXTENDED RELEASE ORAL ONCE
Status: COMPLETED | OUTPATIENT
Start: 2025-05-30 | End: 2025-05-30

## 2025-05-30 RX ORDER — FUROSEMIDE 40 MG/1
40 TABLET ORAL DAILY
Status: DISCONTINUED | OUTPATIENT
Start: 2025-05-31 | End: 2025-05-31 | Stop reason: HOSPADM

## 2025-05-30 RX ORDER — DIGOXIN 125 MCG
250 TABLET ORAL DAILY
Status: DISCONTINUED | OUTPATIENT
Start: 2025-05-31 | End: 2025-05-30

## 2025-05-30 RX ORDER — MAGNESIUM SULFATE HEPTAHYDRATE 40 MG/ML
2 INJECTION, SOLUTION INTRAVENOUS ONCE
Status: COMPLETED | OUTPATIENT
Start: 2025-05-30 | End: 2025-05-30

## 2025-05-30 RX ORDER — DIGOXIN 0.25 MG/ML
250 INJECTION INTRAMUSCULAR; INTRAVENOUS ONCE
Status: DISCONTINUED | OUTPATIENT
Start: 2025-05-30 | End: 2025-05-30

## 2025-05-30 RX ADMIN — METOPROLOL TARTRATE 50 MG: 50 TABLET, FILM COATED ORAL at 21:00

## 2025-05-30 RX ADMIN — FOLIC ACID TAB 400 MCG 400 MCG: 400 TAB at 08:24

## 2025-05-30 RX ADMIN — METOPROLOL TARTRATE 50 MG: 50 TABLET, FILM COATED ORAL at 08:23

## 2025-05-30 RX ADMIN — FUROSEMIDE 40 MG: 10 INJECTION, SOLUTION INTRAVENOUS at 08:23

## 2025-05-30 RX ADMIN — APIXABAN 5 MG: 5 TABLET, FILM COATED ORAL at 17:32

## 2025-05-30 RX ADMIN — POTASSIUM CHLORIDE 40 MEQ: 1500 TABLET, EXTENDED RELEASE ORAL at 06:19

## 2025-05-30 RX ADMIN — MAGNESIUM SULFATE HEPTAHYDRATE 2 G: 40 INJECTION, SOLUTION INTRAVENOUS at 08:24

## 2025-05-30 NOTE — ASSESSMENT & PLAN NOTE
POA  EKG A-fib with RVR and PVCs.  [id number] 3  Likely 2/2 alcohol abuse and mitral regurgitation from prolapse.  Rate/Rhythm control: Lopressor 25 mg every 12 hours.  Remains uncontrolled.  Digoxin loaded with level of 2.7.  Sitter increasing dose of Lopressor if rates remain uncontrolled.  AC: Heparin drip which will bridged to Eliquis.  Monitoring on telemetry  Monitor vital signs and ensure hemodynamically stable.

## 2025-05-30 NOTE — CASE MANAGEMENT
Case Management Progress Note    Patient name Kourtney Jimenez  Location S /S -01 MRN [id number]  : 1960 Date 2025       LOS (days): 3  Geometric Mean LOS (GMLOS) (days): 3.9  Days to GMLOS:1.2        OBJECTIVE:        Current admission status: Inpatient  Preferred Pharmacy:   [facility]/pharmacy #[id number] - [place] - [address]  [address] [place] [id number]  Phone: [phone] Fax: [phone]    Primary Care Provider: Valerie Aguilar MD    Primary Insurance: MEDICARE  Secondary Insurance: [employer]    PROGRESS NOTE:      TC to [facility] pharmacy.     Eliquis- $560.00.   SLIM provider updated.

## 2025-05-30 NOTE — ASSESSMENT & PLAN NOTE
Lab Results   Component Value Date    K 3.2 (L) 05/30/2025    K 3.7 05/29/2025    K 5.1 05/28/2025     Continue to monitor and replete  Keep K > 4

## 2025-05-30 NOTE — ASSESSMENT & PLAN NOTE
Wt Readings from Last 3 Encounters:   05/30/25 69.8 kg (153 lb 14.1 oz)   06/08/20 67.1 kg (148 lb)   05/11/20 67.1 kg (148 lb)   Presented with cough, SOB, fatigue, and BLE edema for the past 3 days  Has been having CARBAJAL for the past month especially with going up stairs    CXR (5/27/25) - increased interstitial lung markings, suggestive of pulmonary edema  CTA PE study (5/27/25) - no PE.  Moderate right effusion with small to moderate left effusion with groundglass haziness in the lungs, correlate with CHF  CXR (5/28/25) - improving pulmonary edema and pleural effusions  With acute hypoxic respiratory failure.  Required BiPAP and ICU admission for hypoxia and increased work of breathing.  Downgraded from ICU on 5/28  Symptoms improved with IV diuretics  Secondary to MR from posterior mitral valve leaflet prolapse  Echo (5/28/25) - EF 55%.  Unable to assess diastolic function due to Afib.  Left atrium is moderately dilated.  Severe mitral regurgitation likely due to posterior mitral valve leaflet prolapse.  Mild TR.  Recommend WILFREDO for further evaluation estimated RVSP is 34.00 mmHg    Plan:  Continue Lopressor 50 mg BID  Continue PO Lasix 40 mg  Outpatient cardiology appointment set up for 6/10 at 1 PM  WILFREDO and CT surgery referral for MV repair will be discussed at this time  Cleared for discharge from cardiology perspective

## 2025-05-30 NOTE — ASSESSMENT & PLAN NOTE
Has been having palpitations intermittently in the past  Presented with new onset Afib HR 150s-160s  Likely secondary to alcohol use  Likely precipitated CHF exacerbation  Initially received IV Cardizem 15 mg and was subsequently put on IV Cardizem gtt  Transitioned to IV metoprolol 2.5 mg q6h  [id number] score 3  Was on heparin drip  Received IV digoxin load    Plan:  Continue Lopressor 50 mg BID  Eliquis 5 mg BID

## 2025-05-30 NOTE — PROGRESS NOTES
Progress Note - Cardiology   Name: Kourtney Jimenez 64 y.o. female I MRN: [id number]  Unit/Bed#: S -01 I Date of Admission: 5/27/2025   Date of Service: 5/30/2025 I Hospital Day: 3    Assessment & Plan  CHF exacerbation (HCC)  Wt Readings from Last 3 Encounters:   05/30/25 69.8 kg (153 lb 14.1 oz)   06/08/20 67.1 kg (148 lb)   05/11/20 67.1 kg (148 lb)   Presented with cough, SOB, fatigue, and BLE edema for the past 3 days  Has been having CARBAJAL for the past month especially with going up stairs    CXR (5/27/25) - increased interstitial lung markings, suggestive of pulmonary edema  CTA PE study (5/27/25) - no PE.  Moderate right effusion with small to moderate left effusion with groundglass haziness in the lungs, correlate with CHF  CXR (5/28/25) - improving pulmonary edema and pleural effusions  With acute hypoxic respiratory failure.  Required BiPAP and ICU admission for hypoxia and increased work of breathing.  Downgraded from ICU on 5/28  Symptoms improved with IV diuretics  Secondary to MR from posterior mitral valve leaflet prolapse  Echo (5/28/25) - EF 55%.  Unable to assess diastolic function due to Afib.  Left atrium is moderately dilated.  Severe mitral regurgitation likely due to posterior mitral valve leaflet prolapse.  Mild TR.  Recommend WILFREDO for further evaluation estimated RVSP is 34.00 mmHg    Plan:  Continue Lopressor 50 mg BID  Continue PO Lasix 40 mg  Outpatient cardiology appointment set up for 6/10 at 1 PM  WILFREDO and CT surgery referral for MV repair will be discussed at this time  Cleared for discharge from cardiology perspective  Atrial fibrillation with RVR (HCC)  Has been having palpitations intermittently in the past  Presented with new onset Afib HR 150s-160s  Likely secondary to alcohol use  Likely precipitated CHF exacerbation  Initially received IV Cardizem 15 mg and was subsequently put on IV Cardizem gtt  Transitioned to IV metoprolol 2.5 mg q6h  [id number] score 3  Was on  heparin drip  Received IV digoxin load    Plan:  Continue Lopressor 50 mg BID  Eliquis 5 mg BID  Acute hypoxic respiratory failure (HCC) (Resolved: 5/30/2025)  Required BIPAP and ICU admission  Secondary to CHF exacerbation  Currently on RA  Anxiety and depression  No longer taking Lexapro  Alcohol use disorder  Drinks 1-2 glasses of wine daily.  Sometimes more.  For the past 20 years  Last drink on 5/24  Received phenobarbital in the ED  Per primary team  Hypokalemia  Lab Results   Component Value Date    K 3.2 (L) 05/30/2025    K 3.7 05/29/2025    K 5.1 05/28/2025     Continue to monitor and replete  Keep K > 4  Hypomagnesemia  Lab Results   Component Value Date    MG 1.8 (L) 05/30/2025    MG 1.8 (L) 05/29/2025    MG 2.0 05/28/2025     Continue to monitor and replete  Keep Mg > 2    Subjective   Patient was seen and examined at bedside.  She denies any chest pain, difficulty breathing, palpitations, lightheadedness.  She has no SOB with walking to and from bathroom.  She is diuresing well.  She is hopeful that she can be discharged later today.  Telemetry shows Afib HR 90s-100s.    Objective :  Temp:  [98.3 °F (36.8 °C)-99.2 °F (37.3 °C)] 98.3 °F (36.8 °C)  HR:  [] 87  BP: ()/(59-99) 93/59  Resp:  [18] 18  SpO2:  [93 %-98 %] 94 %  O2 Device: None (Room air)  Orthostatic Blood Pressures      Flowsheet Row Most Recent Value   Blood Pressure 93/59 filed at 05/30/2025 1045   Patient Position - Orthostatic VS Lying filed at 05/30/2025 0707          First Weight: Weight - Scale: 75 kg (165 lb 5.5 oz) (05/27/25 1333)  Vitals:    05/28/25 1814 05/30/25 0453   Weight: 76.2 kg (168 lb) 69.8 kg (153 lb 14.1 oz)     Physical Exam  Constitutional:       General: She is not in acute distress.     Appearance: Normal appearance. She is not ill-appearing or toxic-appearing.   HENT:      Head: Normocephalic and atraumatic.     Eyes:      Extraocular Movements: Extraocular movements intact.       Cardiovascular:       "Rate and Rhythm: Tachycardia present. Rhythm irregular.      Heart sounds: Murmur heard.   Pulmonary:      Effort: No respiratory distress.      Breath sounds: No wheezing, rhonchi or rales.      Comments: Decreased breath sounds  Abdominal:      General: There is no distension.      Tenderness: There is no abdominal tenderness. There is no guarding or rebound.     Musculoskeletal:         General: No swelling. Normal range of motion.      Right lower leg: No edema.      Left lower leg: No edema.     Skin:     General: Skin is warm.      Capillary Refill: Capillary refill takes less than 2 seconds.      Findings: No erythema or rash.     Neurological:      General: No focal deficit present.      Mental Status: She is alert and oriented to person, place, and time. Mental status is at baseline.           Lab Results: I have reviewed the following results:  Results from last 7 days   Lab Units 05/30/25  0452 05/29/25  0513 05/28/25  0433   WBC Thousand/uL 3.45* 3.27* 4.07*   HEMOGLOBIN g/dL 13.9 13.1 11.8   HEMATOCRIT % 41.9 40.5 37.7   PLATELETS Thousands/uL 158 120* 102*     Results from last 7 days   Lab Units 05/30/25  0452 05/29/25  0513 05/28/25  1227   POTASSIUM mmol/L 3.2* 3.7 5.1   CHLORIDE mmol/L 102 105 103   CO2 mmol/L 29 25 23   BUN mg/dL 6 12 10   CREATININE mg/dL 0.49* 0.55* 0.54*   CALCIUM mg/dL 8.1* 8.2* 8.5     Results from last 7 days   Lab Units 05/30/25  0452 05/29/25  1757 05/29/25  1158 05/28/25  0221 05/27/25  1902   INR   --   --   --   --  1.16   PTT seconds 102* 83* 70*   < > 27    < > = values in this interval not displayed.     No results found for: \"HGBA1C\"  No results found for: \"CKTOTAL\", \"CKMB\", \"CKMBINDEX\", \"TROPONINI\"    Imaging Results Review: I reviewed radiology reports from this admission including: chest xray, CT chest, Ultrasound(s), and Echocardiogram.  Other Study Results Review: EKG was reviewed.     VTE Pharmacologic Prophylaxis: VTE covered by:  heparin (porcine), " Intravenous, 12 Units/kg/hr at 05/30/25 0617  heparin (porcine), Intravenous, 2,000 Units at 05/29/25 0615  heparin (porcine), Intravenous      VTE Mechanical Prophylaxis: sequential compression device

## 2025-05-30 NOTE — ASSESSMENT & PLAN NOTE
"Wt Readings from Last 3 Encounters:   05/30/25 69.8 kg (153 lb 14.1 oz)   06/08/20 67.1 kg (148 lb)   05/11/20 67.1 kg (148 lb)     Lab Results   Component Value Date    LVEF 55 05/28/2025     (H) 05/27/2025     Presents with increased shortness of breath and dyspnea on exertion as well as bilateral lower extremity edema for the past few days however has been symptomatic since the \"holidays\" as she is unable to physically keep up with her normal activities of daily living.  Patient is currently volume overloaded.  Investigations:    Ultrasound BLE negative for DVT.  CXR (5/27/25) - increased interstitial lung markings, suggestive of pulmonary edema  CTA PE study (5/27/25) - no PE.  Moderate right effusion with small to moderate left effusion with groundglass haziness in the lungs, correlate with CHF  CXR (5/28/25) - improving pulmonary edema and pleural effusions   Echo 5/29 revealed EF 55% and severe mitral regurgitation with posterior prolapse leaflet.    GDMT:  Diuretic: Lasix 40 mg p.o. daily  B-Blocker: Lopressor 25 mg every 12 hours  ACE/ARB/ARNi: None  SGLT2i: None  Sodium restriction 2g  CMP, magnesium tomorrow a.m  Goal Mg > 2 and K > 4; Replete prn  HOB > 30°, Daily standing weights, Measure I/O  Monitor on Tele  Volume overload could be secondary to mitral regurgitation from prolapse posterior mitral leaflet.  Recommend outpatient follow-up for WILFREDO and CT surgery evaluation.  "

## 2025-05-30 NOTE — ASSESSMENT & PLAN NOTE
Lab Results   Component Value Date    MG 1.8 (L) 05/30/2025    MG 1.8 (L) 05/29/2025    MG 2.0 05/28/2025     Continue to monitor and replete  Keep Mg > 2

## 2025-05-30 NOTE — ASSESSMENT & PLAN NOTE
Recent Labs     05/28/25  0433 05/29/25  0513 05/30/25  0452   MG 2.0 1.8* 1.8*     Replete as needed.

## 2025-05-30 NOTE — ASSESSMENT & PLAN NOTE
Drinks 1-2 glasses of wine daily.  Sometimes more.  For the past 20 years  Last drink on 5/24  Received phenobarbital in the ED  Per primary team   on unit

## 2025-05-30 NOTE — PROGRESS NOTES
"Progress Note - Hospitalist   Name: Kourtney Jimenez 64 y.o. female I MRN: [id number]  Unit/Bed#: S -01 I Date of Admission: 5/27/2025   Date of Service: 5/30/2025 I Hospital Day: 3    Assessment & Plan  CHF exacerbation (HCC)  Wt Readings from Last 3 Encounters:   05/30/25 69.8 kg (153 lb 14.1 oz)   06/08/20 67.1 kg (148 lb)   05/11/20 67.1 kg (148 lb)     Lab Results   Component Value Date    LVEF 55 05/28/2025     (H) 05/27/2025     Presents with increased shortness of breath and dyspnea on exertion as well as bilateral lower extremity edema for the past few days however has been symptomatic since the \"holidays\" as she is unable to physically keep up with her normal activities of daily living.  Patient is currently volume overloaded.  Investigations:    Ultrasound BLE negative for DVT.  CXR (5/27/25) - increased interstitial lung markings, suggestive of pulmonary edema  CTA PE study (5/27/25) - no PE.  Moderate right effusion with small to moderate left effusion with groundglass haziness in the lungs, correlate with CHF  CXR (5/28/25) - improving pulmonary edema and pleural effusions   Echo 5/29 revealed EF 55% and severe mitral regurgitation with posterior prolapse leaflet.    GDMT:  Diuretic: Lasix 40 mg p.o. daily  B-Blocker: Lopressor 25 mg every 12 hours  ACE/ARB/ARNi: None  SGLT2i: None  Sodium restriction 2g  CMP, magnesium tomorrow a.m  Goal Mg > 2 and K > 4; Replete prn  HOB > 30°, Daily standing weights, Measure I/O  Monitor on Tele  Volume overload could be secondary to mitral regurgitation from prolapse posterior mitral leaflet.  Recommend outpatient follow-up for WILFREDO and CT surgery evaluation.  Atrial fibrillation with RVR (HCC)  POA  EKG A-fib with RVR and PVCs.  [id number] 3  Likely 2/2 alcohol abuse and mitral regurgitation from prolapse.  Rate/Rhythm control: Lopressor 25 mg every 12 hours.  Remains uncontrolled.  Digoxin loaded with level of 2.7.  Sitter increasing dose of " Lopressor if rates remain uncontrolled.  AC: Heparin drip which will bridged to Eliquis.  Monitoring on telemetry  Monitor vital signs and ensure hemodynamically stable.  Pleural effusion  Likely secondary to new onset CHF.  Treated with diuresis.  Alcohol use disorder  Alcohol use: Last drink 3 days ago however was a daily drinker baseline.  Alcohol Withdrawal Prevention:  Initiate CIWA protocol and administer benzodiazepines as needed.  Arrange follow-up with primary care for ongoing management of nutritional status and electrolyte balance.  Anxiety and depression  Continue PTA antidepressants.  Hypomagnesemia  Recent Labs     05/28/25  0433 05/29/25  0513 05/30/25  0452   MG 2.0 1.8* 1.8*     Replete as needed.    VTE Pharmacologic Prophylaxis:   High Risk (Score >/= 5) - Pharmacological DVT Prophylaxis Ordered: heparin drip. Sequential Compression Devices Ordered.    Mobility:   Basic Mobility Inpatient Raw Score: 22  JH-HLM Goal: 7: Walk 25 feet or more  JH-HLM Achieved: 8: Walk 250 feet ot more  JH-HLM Goal NOT achieved. Continue with multidisciplinary rounding and encourage appropriate mobility to improve upon JH-HLM goals.    Patient Centered Rounds: I performed bedside rounds with nursing staff today.   Discussions with Specialists or Other Care Team Provider: Cardiology    Education and Discussions with Family / Patient: Updated  () at bedside.    Current Length of Stay: 3 day(s)  Current Patient Status: Inpatient   Certification Statement: The patient will continue to require additional inpatient hospital stay due to CHF and A-fib with RVR  Discharge Plan: Anticipate discharge in 48-72 hrs to home.    Code Status: Level 1 - Full Code    Subjective   Patient seen and examined at bedside today.  There were no overnight events.  Reports feeling better as compared to day of admission.  Patient now on room air and saturating well.  We also discussed medication management to control her  rate and anticoagulation.  We also discussed heart failure and its complications and the need for optimizing medical management to prevent progression.  She will follow-up with cardiology in the outpatient setting. Otherwise denies any new symptoms.    Objective :  Temp:  [98.3 °F (36.8 °C)-99.2 °F (37.3 °C)] 98.3 °F (36.8 °C)  HR:  [] 87  BP: ()/(59-99) 93/59  Resp:  [18] 18  SpO2:  [93 %-98 %] 94 %  O2 Device: None (Room air)    Body mass index is 26.41 kg/m².     Input and Output Summary (last 24 hours):     Intake/Output Summary (Last 24 hours) at 5/30/2025 1407  Last data filed at 5/30/2025 0100  Gross per 24 hour   Intake 240 ml   Output 900 ml   Net -660 ml       Physical Exam  Constitutional:       Appearance: Normal appearance.   HENT:      Head: Normocephalic and atraumatic.     Eyes:      Extraocular Movements: Extraocular movements intact.      Conjunctiva/sclera: Conjunctivae normal.      Pupils: Pupils are equal, round, and reactive to light.       Cardiovascular:      Rate and Rhythm: Tachycardia present. Rhythm irregular.      Pulses: Normal pulses.      Heart sounds: Normal heart sounds. No murmur heard.     No gallop.   Pulmonary:      Effort: Pulmonary effort is normal. No respiratory distress.      Breath sounds: Normal breath sounds. No wheezing.   Abdominal:      General: Bowel sounds are normal. There is no distension.      Palpations: Abdomen is soft.      Tenderness: There is no abdominal tenderness.     Musculoskeletal:      Right lower leg: Edema present.      Left lower leg: Edema present.     Skin:     Capillary Refill: Capillary refill takes less than 2 seconds.     Neurological:      General: No focal deficit present.      Mental Status: She is alert and oriented to person, place, and time. Mental status is at baseline.     Psychiatric:         Mood and Affect: Mood normal.         Behavior: Behavior normal.           Telemetry:  Telemetry Orders (From admission, onward)                24 Hour Telemetry Monitoring  Continuous x 24 Hours (Telem)        Expiring   Question:  Reason for 24 Hour Telemetry  Answer:  Arrhythmias requiring acute medical intervention / PPM or ICD malfunction                     Telemetry Reviewed: Atrial fibrillation. HR averaging 100  Indication for Continued Telemetry Use: Acute CHF on >200 mg lasix/day or equivalent dose or with new reduced EF.                Lab Results: I have reviewed the following results:   Results from last 7 days   Lab Units 05/30/25  0452   WBC Thousand/uL 3.45*   HEMOGLOBIN g/dL 13.9   HEMATOCRIT % 41.9   PLATELETS Thousands/uL 158   SEGS PCT % 42*   LYMPHO PCT % 31   MONO PCT % 16*   EOS PCT % 10*     Results from last 7 days   Lab Units 05/30/25  0452 05/28/25  1227 05/28/25  0433   SODIUM mmol/L 139   < > 139   POTASSIUM mmol/L 3.2*   < > 3.3*   CHLORIDE mmol/L 102   < > 106   CO2 mmol/L 29   < > 21   BUN mg/dL 6   < > 9   CREATININE mg/dL 0.49*   < > 0.53*   ANION GAP mmol/L 8   < > 12   CALCIUM mg/dL 8.1*   < > 7.8*   ALBUMIN g/dL  --   --  3.4*   TOTAL BILIRUBIN mg/dL  --   --  1.78*   ALK PHOS U/L  --   --  61   ALT U/L  --   --  43   AST U/L  --   --  33   GLUCOSE RANDOM mg/dL 78   < > 75    < > = values in this interval not displayed.     Results from last 7 days   Lab Units 05/27/25  1902   INR  1.16             Results from last 7 days   Lab Units 05/27/25 2007   LACTIC ACID mmol/L 1.2       Recent Cultures (last 7 days):               Last 24 Hours Medication List:     Current Facility-Administered Medications:     folic acid (FOLVITE) tablet 400 mcg, Daily    [START ON 5/31/2025] furosemide (LASIX) tablet 40 mg, Daily    heparin (porcine) 25,000 units in 0.45% NaCl 250 mL infusion (premix), Titrated, Last Rate: 12 Units/kg/hr (05/30/25 1308)    heparin (porcine) injection 2,000 Units, Q6H PRN    heparin (porcine) injection 4,000 Units, Q6H PRN    metoprolol tartrate (LOPRESSOR) tablet 50 mg, Q12H VIKTORIA    [START ON  5/31/2025] thiamine tablet 100 mg, Daily    Administrative Statements   Today, Patient Was Seen By: Jared Duque MD      **Please Note: This note may have been constructed using a voice recognition system.**

## 2025-05-30 NOTE — PLAN OF CARE
Problem: PAIN - ADULT  Goal: Verbalizes/displays adequate comfort level or baseline comfort level  Description: Interventions:  - Encourage patient to monitor pain and request assistance  - Assess pain using appropriate pain scale  - Administer analgesics as ordered based on type and severity of pain and evaluate response  - Implement non-pharmacological measures as appropriate and evaluate response  - Consider cultural and social influences on pain and pain management  - Notify physician/advanced practitioner if interventions unsuccessful or patient reports new pain  - Educate patient/family on pain management process including their role and importance of  reporting pain   - Provide non-pharmacologic/complimentary pain relief interventions  Outcome: Progressing     Problem: INFECTION - ADULT  Goal: Absence or prevention of progression during hospitalization  Description: INTERVENTIONS:  - Assess and monitor for signs and symptoms of infection  - Monitor lab/diagnostic results  - Monitor all insertion sites, i.e. indwelling lines, tubes, and drains  - Monitor endotracheal if appropriate and nasal secretions for changes in amount and color  - [place] appropriate cooling/warming therapies per order  - Administer medications as ordered  - Instruct and encourage patient and family to use good hand hygiene technique  - Identify and instruct in appropriate isolation precautions for identified infection/condition  Outcome: Progressing  Goal: Absence of fever/infection during neutropenic period  Description: INTERVENTIONS:  - Monitor WBC  - Perform strict hand hygiene  - Limit to healthy visitors only  - No plants, dried, fresh or silk flowers with alan in patient room  Outcome: Progressing     Problem: SAFETY ADULT  Goal: Patient will remain free of falls  Description: INTERVENTIONS:  - Educate patient/family on patient safety including physical limitations  - Instruct patient to call for assistance with activity   -  Consider consulting OT/PT to assist with strengthening/mobility based on AM PAC & JH-HLM score  - Consult OT/PT to assist with strengthening/mobility   - Keep Call bell within reach  - Keep bed low and locked with side rails adjusted as appropriate  - Keep care items and personal belongings within reach  - Initiate and maintain comfort rounds  - Make Fall Risk Sign visible to staff  - Apply yellow socks and bracelet for high fall risk patients  - Consider moving patient to room near nurses station  Outcome: Progressing  Goal: Maintain or return to baseline ADL function  Description: INTERVENTIONS:  -  Assess patient's ability to carry out ADLs; assess patient's baseline for ADL function and identify physical deficits which impact ability to perform ADLs (bathing, care of mouth/teeth, toileting, grooming, dressing, etc.)  - Assess/evaluate cause of self-care deficits   - Assess range of motion  - Assess patient's mobility; develop plan if impaired  - Assess patient's need for assistive devices and provide as appropriate  - Encourage maximum independence but intervene and supervise when necessary  - Involve family in performance of ADLs  - Assess for home care needs following discharge   - Consider OT consult to assist with ADL evaluation and planning for discharge  - Provide patient education as appropriate  - Monitor functional capacity and physical performance, use of AM PAC & JH-HLM   - Monitor gait, balance and fatigue with ambulation    Outcome: Progressing  Goal: Maintains/Returns to pre admission functional level  Description: INTERVENTIONS:  - Perform AM-PAC 6 Click Basic Mobility/ Daily Activity assessment daily.  - Set and communicate daily mobility goal to care team and patient/family/caregiver.   - Collaborate with rehabilitation services on mobility goals if consulted  - Perform Range of Motion  - Out of bed for toileting  - Record patient progress and toleration of activity level   Outcome:  Progressing     Problem: DISCHARGE PLANNING  Goal: Discharge to home or other facility with appropriate resources  Description: INTERVENTIONS:  - Identify barriers to discharge w/patient and caregiver  - Arrange for needed discharge resources and transportation as appropriate  - Identify discharge learning needs (meds, wound care, etc.)  - Arrange for interpretive services to assist at discharge as needed  - Refer to Case Management Department for coordinating discharge planning if the patient needs post-hospital services based on physician/advanced practitioner order or complex needs related to functional status, cognitive ability, or social support system  Outcome: Progressing     Problem: Knowledge Deficit  Goal: Patient/family/caregiver demonstrates understanding of disease process, treatment plan, medications, and discharge instructions  Description: Complete learning assessment and assess knowledge base.  Interventions:  - Provide teaching at level of understanding  - Provide teaching via preferred learning methods  Outcome: Progressing     Problem: Prexisting or High Potential for Compromised Skin Integrity  Goal: Skin integrity is maintained or improved  Description: INTERVENTIONS:  - Identify patients at risk for skin breakdown  - Assess and monitor skin integrity including under and around medical devices   - Assess and monitor nutrition and hydration status  - Monitor labs  - Assess for incontinence   - Turn and reposition patient  - Assist with mobility/ambulation  - Relieve pressure over eran prominences   - Avoid friction and shearing  - Provide appropriate hygiene as needed including keeping skin clean and dry  - Evaluate need for skin moisturizer/barrier cream  - Collaborate with interdisciplinary team  - Patient/family teaching  - Consider wound care consult    Assess:  - Review Kev scale daily  - Clean and moisturize skin   - Inspect skin when repositioning, toileting, and assisting with  ADLS  - Assess under medical devices   - Assess extremities for adequate circulation and sensation     Bed Management:  - Have minimal linens on bed & keep smooth, unwrinkled  - Change linens as needed when moist or perspiring  - Toileting:  - Offer bedside commode  - Assess for incontinence   - Use incontinent care products after each incontinent episode  Activity:  - Mobilize patient  - Encourage activity and walks on unit  - Encourage or provide ROM exercises   - Turn and reposition patient  - Use appropriate equipment to lift or move patient in bed  - Instruct/ Assist with weight shifting   - Consider limitation of chair time    Skin Care:  - Avoid use of baby powder, tape, friction and shearing, hot water or constrictive clothing  - Relieve pressure over bony prominences  - Do not massage red bony areas    Next Steps:  - Teach patient strategies to minimize risks   - Consider consults to  interdisciplinary teams  Outcome: Progressing     Problem: CARDIOVASCULAR - ADULT  Goal: Maintains optimal cardiac output and hemodynamic stability  Description: INTERVENTIONS:  - Monitor I/O, vital signs and rhythm  - Monitor for S/S and trends of decreased cardiac output  - Administer and titrate ordered vasoactive medications to optimize hemodynamic stability  - Assess quality of pulses, skin color and temperature  - Assess for signs of decreased coronary artery perfusion  - Instruct patient to report change in severity of symptoms  Outcome: Progressing  Goal: Absence of cardiac dysrhythmias or at baseline rhythm  Description: INTERVENTIONS:  - Continuous cardiac monitoring, vital signs, obtain 12 lead EKG if ordered  - Administer antiarrhythmic and heart rate control medications as ordered  - Monitor electrolytes and administer replacement therapy as ordered  Outcome: Progressing     Problem: RESPIRATORY - ADULT  Goal: Achieves optimal ventilation and oxygenation  Description: INTERVENTIONS:  - Assess for changes in  respiratory status  - Assess for changes in mentation and behavior  - Position to facilitate oxygenation and minimize respiratory effort  - Oxygen administered by appropriate delivery if ordered  - Initiate smoking cessation education as indicated  - Encourage broncho-pulmonary hygiene including cough, deep breathe, Incentive Spirometry  - Assess the need for suctioning and aspirate as needed  - Assess and instruct to report SOB or any respiratory difficulty  - Respiratory Therapy support as indicated  Outcome: Progressing     Problem: Nutrition/Hydration-ADULT  Goal: Nutrient/Hydration intake appropriate for improving, restoring or maintaining nutritional needs  Description: Monitor and assess patient's nutrition/hydration status for malnutrition. Collaborate with interdisciplinary team and initiate plan and interventions as ordered.  Monitor patient's weight and dietary intake as ordered or per policy. Utilize nutrition screening tool and intervene as necessary. Determine patient's food preferences and provide high-protein, high-caloric foods as appropriate.     INTERVENTIONS:  - Monitor oral intake, urinary output, labs, and treatment plans  - Assess nutrition and hydration status and recommend course of action  - Evaluate amount of meals eaten  - Assist patient with eating if necessary   - Allow adequate time for meals  - Recommend/ encourage appropriate diets, oral nutritional supplements, and vitamin/mineral supplements  - Order, calculate, and assess calorie counts as needed  - Recommend, monitor, and adjust tube feedings and TPN/PPN based on assessed needs  - Assess need for intravenous fluids  - Provide specific nutrition/hydration education as appropriate  - Include patient/family/caregiver in decisions related to nutrition  Outcome: Progressing

## 2025-05-30 NOTE — DISCHARGE INSTR - AVS FIRST PAGE
Dear Kourtney Jimenez,     It was our pleasure to care for you here at [facility].  It is our hope that we were always able to exceed the expected standards for your care during your stay.  You were hospitalized due to atrial fibrillation and heart failure.  You were cared for on the third floor by Jared Duque MD under the service of Kathryn David MD with the [facility] who covers for your primary care physician (PCP), Valerie Aguilar MD, while you were hospitalized.  If you have any questions or concerns related to this hospitalization, you may contact us at .  For follow up as well as any medication refills, we recommend that you follow up with your primary care physician.  A registered nurse will reach out to you by phone within a few days after your discharge to answer any additional questions that you may have after going home.  However, at this time we provide for you here, the most important instructions / recommendations at discharge:       Notable Medication Adjustments -   Please take Lasix 40 mg daily  Please take metoprolol 50 mg twice a day.  Take Eliquis 5 mg twice a day.  Testing Required after Discharge -   Possible transesophageal echocardiogram pending discussion with outpatient cardiologist.  ** Please contact your PCP to request testing orders for any of the testing recommended here **  Important follow up information -   Please follow-up with your PCP within a week of discharge  Please follow-up with cardiology in the outpatient setting on [address].  If you gain more than 2 lbs in one day or 5 lbs in a week, reach out to your PCP.  Other Instructions -   Please take all your medications regularly as directed  If symptoms return/persist contact your PCP if unable then visit to nearest ER    Please review this entire after visit summary as additional general instructions including medication list, appointments, activity,  diet, any pertinent wound care, and other additional recommendations from your care team that may be provided for you.      Sincerely,   Jared Duque MD

## 2025-05-31 VITALS
HEIGHT: 64 IN | TEMPERATURE: 97.8 F | RESPIRATION RATE: 18 BRPM | WEIGHT: 152.4 LBS | BODY MASS INDEX: 26.02 KG/M2 | HEART RATE: 76 BPM | DIASTOLIC BLOOD PRESSURE: 72 MMHG | SYSTOLIC BLOOD PRESSURE: 102 MMHG | OXYGEN SATURATION: 93 %

## 2025-05-31 PROBLEM — E83.42 HYPOMAGNESEMIA: Status: RESOLVED | Noted: 2025-05-28 | Resolved: 2025-05-31

## 2025-05-31 LAB
ANION GAP SERPL CALCULATED.3IONS-SCNC: 5 MMOL/L (ref 4–13)
BUN SERPL-MCNC: 10 MG/DL (ref 5–25)
CALCIUM SERPL-MCNC: 8.4 MG/DL (ref 8.4–10.2)
CHLORIDE SERPL-SCNC: 103 MMOL/L (ref 96–108)
CO2 SERPL-SCNC: 31 MMOL/L (ref 21–32)
CREAT SERPL-MCNC: 0.58 MG/DL (ref 0.6–1.3)
GFR SERPL CREATININE-BSD FRML MDRD: 97 ML/MIN/1.73SQ M
GLUCOSE SERPL-MCNC: 96 MG/DL (ref 65–140)
MAGNESIUM SERPL-MCNC: 2.2 MG/DL (ref 1.9–2.7)
POTASSIUM SERPL-SCNC: 4.4 MMOL/L (ref 3.5–5.3)
SODIUM SERPL-SCNC: 139 MMOL/L (ref 135–147)

## 2025-05-31 PROCEDURE — 99232 SBSQ HOSP IP/OBS MODERATE 35: CPT | Performed by: INTERNAL MEDICINE

## 2025-05-31 PROCEDURE — 83735 ASSAY OF MAGNESIUM: CPT

## 2025-05-31 PROCEDURE — 80048 BASIC METABOLIC PNL TOTAL CA: CPT

## 2025-05-31 PROCEDURE — 99239 HOSP IP/OBS DSCHRG MGMT >30: CPT | Performed by: INTERNAL MEDICINE

## 2025-05-31 RX ORDER — FUROSEMIDE 40 MG/1
40 TABLET ORAL DAILY
Qty: 30 TABLET | Refills: 1 | Status: SHIPPED | OUTPATIENT
Start: 2025-06-01

## 2025-05-31 RX ORDER — METOPROLOL TARTRATE 50 MG
50 TABLET ORAL EVERY 12 HOURS SCHEDULED
Qty: 60 TABLET | Refills: 1 | Status: SHIPPED | OUTPATIENT
Start: 2025-05-31

## 2025-05-31 RX ORDER — FOLIC ACID 0.4 MG
400 TABLET ORAL DAILY
Qty: 30 TABLET | Refills: 1 | Status: SHIPPED | OUTPATIENT
Start: 2025-05-31

## 2025-05-31 RX ORDER — LANOLIN ALCOHOL/MO/W.PET/CERES
100 CREAM (GRAM) TOPICAL DAILY
Qty: 30 TABLET | Refills: 1 | Status: SHIPPED | OUTPATIENT
Start: 2025-05-31

## 2025-05-31 RX ADMIN — FOLIC ACID TAB 400 MCG 400 MCG: 400 TAB at 08:03

## 2025-05-31 RX ADMIN — Medication 100 MG: at 08:03

## 2025-05-31 RX ADMIN — APIXABAN 5 MG: 5 TABLET, FILM COATED ORAL at 08:02

## 2025-05-31 RX ADMIN — METOPROLOL TARTRATE 50 MG: 50 TABLET, FILM COATED ORAL at 08:02

## 2025-05-31 NOTE — ASSESSMENT & PLAN NOTE
Drinks 1-2 glasses of wine daily.  Sometimes more.  For the past 20 years  Last drink on 5/24  Received phenobarbital in the ED  Per primary team

## 2025-05-31 NOTE — ASSESSMENT & PLAN NOTE
Has been having palpitations intermittently in the past  Presented with new onset Afib HR 150s-160s  Likely secondary to alcohol use  Likely precipitated CHF exacerbation  Initially received IV Cardizem 15 mg and was subsequently put on IV Cardizem gtt  Transitioned to IV metoprolol 2.5 mg q6h  [id number] score 3  Was on heparin drip  Received IV digoxin load    Plan:  Continue Lopressor 50 mg BID  Eliquis 5 mg BID

## 2025-05-31 NOTE — ASSESSMENT & PLAN NOTE
Wt Readings from Last 3 Encounters:   05/31/25 69.1 kg (152 lb 6.4 oz)   06/08/20 67.1 kg (148 lb)   05/11/20 67.1 kg (148 lb)   Presented with cough, SOB, fatigue, and BLE edema for the past 3 days  Has been having CARBAJAL for the past month especially with going up stairs    CXR (5/27/25) - increased interstitial lung markings, suggestive of pulmonary edema  CTA PE study (5/27/25) - no PE.  Moderate right effusion with small to moderate left effusion with groundglass haziness in the lungs, correlate with CHF  CXR (5/28/25) - improving pulmonary edema and pleural effusions  With acute hypoxic respiratory failure.  Required BiPAP and ICU admission for hypoxia and increased work of breathing.  Downgraded from ICU on 5/28  Symptoms improved with IV diuretics  Secondary to MR from posterior mitral valve leaflet prolapse  Echo (5/28/25) - EF 55%.  Unable to assess diastolic function due to Afib.  Left atrium is moderately dilated.  Severe mitral regurgitation likely due to posterior mitral valve leaflet prolapse.  Mild TR.  Recommend WILFREDO for further evaluation estimated RVSP is 34.00 mmHg    Plan:  Continue Lopressor 50 mg BID  Continue PO Lasix 40 mg  Outpatient cardiology appointment set up for 6/10 at 1 PM  WILFREDO and CT surgery referral for MV repair will be discussed at this time  Cleared for discharge from cardiology perspective

## 2025-05-31 NOTE — ASSESSMENT & PLAN NOTE
"Wt Readings from Last 3 Encounters:   05/31/25 69.1 kg (152 lb 6.4 oz)   06/08/20 67.1 kg (148 lb)   05/11/20 67.1 kg (148 lb)     Lab Results   Component Value Date    LVEF 55 05/28/2025     (H) 05/27/2025     Presents with increased shortness of breath and dyspnea on exertion as well as bilateral lower extremity edema for the past few days however has been symptomatic since the \"holidays\" as she is unable to physically keep up with her normal activities of daily living.  Patient is currently volume overloaded.  Investigations:    Ultrasound BLE negative for DVT.  CXR (5/27/25) - increased interstitial lung markings, suggestive of pulmonary edema  CTA PE study (5/27/25) - no PE.  Moderate right effusion with small to moderate left effusion with groundglass haziness in the lungs, correlate with CHF  CXR (5/28/25) - improving pulmonary edema and pleural effusions   Echo 5/29 revealed EF 55% and severe mitral regurgitation with posterior prolapse leaflet.    GDMT:  Diuretic: Lasix 40 mg p.o. daily  B-Blocker: Lopressor 50 mg every 12 hours  ACE/ARB/ARNi: None  SGLT2i: None  Recommend outpatient follow-up for WILFREDO and CT surgery evaluation.  Started Eliquis 5 mg twice daily  " Wounds

## 2025-05-31 NOTE — ASSESSMENT & PLAN NOTE
POA  EKG A-fib with RVR and PVCs.  [id number] 3  Likely 2/2 alcohol abuse and mitral regurgitation from prolapse.  Rate/Rhythm control: Starting on Lopressor 50 mg twice daily  AC: Heparin drip which will bridged to Eliquis.

## 2025-05-31 NOTE — ASSESSMENT & PLAN NOTE
Lab Results   Component Value Date    K 4.4 05/31/2025    K 3.2 (L) 05/30/2025    K 3.7 05/29/2025     Continue to monitor and replete  Keep K > 4

## 2025-05-31 NOTE — PLAN OF CARE
Problem: PAIN - ADULT  Goal: Verbalizes/displays adequate comfort level or baseline comfort level  Description: Interventions:  - Encourage patient to monitor pain and request assistance  - Assess pain using appropriate pain scale  - Administer analgesics as ordered based on type and severity of pain and evaluate response  - Implement non-pharmacological measures as appropriate and evaluate response  - Consider cultural and social influences on pain and pain management  - Notify physician/advanced practitioner if interventions unsuccessful or patient reports new pain  - Educate patient/family on pain management process including their role and importance of  reporting pain   - Provide non-pharmacologic/complimentary pain relief interventions  5/31/2025 1156 by Milla Mensah RN  Outcome: Adequate for Discharge  5/31/2025 1031 by Milla Mensah RN  Outcome: Progressing     Problem: INFECTION - ADULT  Goal: Absence or prevention of progression during hospitalization  Description: INTERVENTIONS:  - Assess and monitor for signs and symptoms of infection  - Monitor lab/diagnostic results  - Monitor all insertion sites, i.e. indwelling lines, tubes, and drains  - Monitor endotracheal if appropriate and nasal secretions for changes in amount and color  - Gatzke appropriate cooling/warming therapies per order  - Administer medications as ordered  - Instruct and encourage patient and family to use good hand hygiene technique  - Identify and instruct in appropriate isolation precautions for identified infection/condition  5/31/2025 1156 by Milla Mensah RN  Outcome: Adequate for Discharge  5/31/2025 1031 by Milla Mensah RN  Outcome: Progressing  Goal: Absence of fever/infection during neutropenic period  Description: INTERVENTIONS:  - Monitor WBC  - Perform strict hand hygiene  - Limit to healthy visitors only  - No plants, dried, fresh or silk flowers with alan in patient room  5/31/2025 1156 by  Milla Mensah RN  Outcome: Adequate for Discharge  5/31/2025 1031 by Milla Mensah RN  Outcome: Progressing     Problem: SAFETY ADULT  Goal: Patient will remain free of falls  Description: INTERVENTIONS:  - Educate patient/family on patient safety including physical limitations  - Instruct patient to call for assistance with activity   - Consider consulting OT/PT to assist with strengthening/mobility based on AM PAC & JH-HLM score  - Consult OT/PT to assist with strengthening/mobility   - Keep Call bell within reach  - Keep bed low and locked with side rails adjusted as appropriate  - Keep care items and personal belongings within reach  - Initiate and maintain comfort rounds  - Make Fall Risk Sign visible to staff  - Offer Toileting every 2 Hours, in advance of need  - Initiate/Maintain bed alarm  - Obtain necessary fall risk management equipment: bed alarm  - Apply yellow socks and bracelet for high fall risk patients  - Consider moving patient to room near nurses station  5/31/2025 1156 by Milla Mensah RN  Outcome: Adequate for Discharge  5/31/2025 1031 by Milla Mensah RN  Outcome: Progressing  Goal: Maintain or return to baseline ADL function  Description: INTERVENTIONS:  -  Assess patient's ability to carry out ADLs; assess patient's baseline for ADL function and identify physical deficits which impact ability to perform ADLs (bathing, care of mouth/teeth, toileting, grooming, dressing, etc.)  - Assess/evaluate cause of self-care deficits   - Assess range of motion  - Assess patient's mobility; develop plan if impaired  - Assess patient's need for assistive devices and provide as appropriate  - Encourage maximum independence but intervene and supervise when necessary  - Involve family in performance of ADLs  - Assess for home care needs following discharge   - Consider OT consult to assist with ADL evaluation and planning for discharge  - Provide patient education as appropriate  -  Monitor functional capacity and physical performance, use of AM PAC & -HLM   - Monitor gait, balance and fatigue with ambulation    5/31/2025 1156 by Milla Mensah RN  Outcome: Adequate for Discharge  5/31/2025 1031 by Milla Mensah RN  Outcome: Progressing  Goal: Maintains/Returns to pre admission functional level  Description: INTERVENTIONS:  - Perform AM-PAC 6 Click Basic Mobility/ Daily Activity assessment daily.  - Set and communicate daily mobility goal to care team and patient/family/caregiver.   - Collaborate with rehabilitation services on mobility goals if consulted  - Perform Range of Motion 3 times a day.  - Reposition patient every 2 hours.  - Dangle patient 3 times a day  - Stand patient 3 times a day  - Ambulate patient 3 times a day  - Out of bed to chair 3 times a day   - Out of bed for meals 3 times a day  - Out of bed for toileting  - Record patient progress and toleration of activity level   5/31/2025 1156 by Milla Mensah RN  Outcome: Adequate for Discharge  5/31/2025 1031 by Milla Mensah RN  Outcome: Progressing     Problem: DISCHARGE PLANNING  Goal: Discharge to home or other facility with appropriate resources  Description: INTERVENTIONS:  - Identify barriers to discharge w/patient and caregiver  - Arrange for needed discharge resources and transportation as appropriate  - Identify discharge learning needs (meds, wound care, etc.)  - Arrange for interpretive services to assist at discharge as needed  - Refer to Case Management Department for coordinating discharge planning if the patient needs post-hospital services based on physician/advanced practitioner order or complex needs related to functional status, cognitive ability, or social support system  5/31/2025 1156 by Milla Mensah RN  Outcome: Adequate for Discharge  5/31/2025 1031 by Milla Mensah RN  Outcome: Progressing     Problem: Knowledge Deficit  Goal: Patient/family/caregiver demonstrates  understanding of disease process, treatment plan, medications, and discharge instructions  Description: Complete learning assessment and assess knowledge base.  Interventions:  - Provide teaching at level of understanding  - Provide teaching via preferred learning methods  5/31/2025 1156 by Milla Mensah RN  Outcome: Adequate for Discharge  5/31/2025 1031 by Milla Mensah RN  Outcome: Progressing     Problem: Prexisting or High Potential for Compromised Skin Integrity  Goal: Skin integrity is maintained or improved  Description: INTERVENTIONS:  - Identify patients at risk for skin breakdown  - Assess and monitor skin integrity including under and around medical devices   - Assess and monitor nutrition and hydration status  - Monitor labs  - Assess for incontinence   - Turn and reposition patient  - Assist with mobility/ambulation  - Relieve pressure over eran prominences   - Avoid friction and shearing  - Provide appropriate hygiene as needed including keeping skin clean and dry  - Evaluate need for skin moisturizer/barrier cream  - Collaborate with interdisciplinary team  - Patient/family teaching  - Consider wound care consult      Outcome: Adequate for Discharge  5/31/2025 1031 by Milla Mensah RN  Outcome: Progressing     Problem: Nutrition/Hydration-ADULT  Goal: Nutrient/Hydration intake appropriate for improving, restoring or maintaining nutritional needs  Description: Monitor and assess patient's nutrition/hydration status for malnutrition. Collaborate with interdisciplinary team and initiate plan and interventions as ordered.  Monitor patient's weight and dietary intake as ordered or per policy. Utilize nutrition screening tool and intervene as necessary. Determine patient's food preferences and provide high-protein, high-caloric foods as appropriate.     INTERVENTIONS:  - Monitor oral intake, urinary output, labs, and treatment plans  - Assess nutrition and hydration status and recommend  course of action  - Evaluate amount of meals eaten  - Assist patient with eating if necessary   - Allow adequate time for meals  - Recommend/ encourage appropriate diets, oral nutritional supplements, and vitamin/mineral supplements  - Order, calculate, and assess calorie counts as needed  - Recommend, monitor, and adjust tube feedings and TPN/PPN based on assessed needs  - Assess need for intravenous fluids  - Provide specific nutrition/hydration education as appropriate  - Include patient/family/caregiver in decisions related to nutrition  5/31/2025 1156 by Milla Mensah RN  Outcome: Adequate for Discharge  5/31/2025 1031 by Milla Mensah RN  Outcome: Progressing     Problem: CARDIOVASCULAR - ADULT  Goal: Maintains optimal cardiac output and hemodynamic stability  Description: INTERVENTIONS:  - Monitor I/O, vital signs and rhythm  - Monitor for S/S and trends of decreased cardiac output  - Administer and titrate ordered vasoactive medications to optimize hemodynamic stability  - Assess quality of pulses, skin color and temperature  - Assess for signs of decreased coronary artery perfusion  - Instruct patient to report change in severity of symptoms  5/31/2025 1156 by Milla Mensah RN  Outcome: Adequate for Discharge  5/31/2025 1031 by Milla Mensah RN  Outcome: Progressing  Goal: Absence of cardiac dysrhythmias or at baseline rhythm  Description: INTERVENTIONS:  - Continuous cardiac monitoring, vital signs, obtain 12 lead EKG if ordered  - Administer antiarrhythmic and heart rate control medications as ordered  - Monitor electrolytes and administer replacement therapy as ordered  5/31/2025 1156 by Milla Mensah RN  Outcome: Adequate for Discharge  5/31/2025 1031 by Milla Mensah RN  Outcome: Progressing     Problem: RESPIRATORY - ADULT  Goal: Achieves optimal ventilation and oxygenation  Description: INTERVENTIONS:  - Assess for changes in respiratory status  - Assess for  changes in mentation and behavior  - Position to facilitate oxygenation and minimize respiratory effort  - Oxygen administered by appropriate delivery if ordered  - Initiate smoking cessation education as indicated  - Encourage broncho-pulmonary hygiene including cough, deep breathe, Incentive Spirometry  - Assess the need for suctioning and aspirate as needed  - Assess and instruct to report SOB or any respiratory difficulty  - Respiratory Therapy support as indicated  5/31/2025 1156 by Milla Mensah RN  Outcome: Adequate for Discharge  5/31/2025 1031 by Milla Mensah RN  Outcome: Progressing     Problem: METABOLIC, FLUID AND ELECTROLYTES - ADULT  Goal: Electrolytes maintained within normal limits  Description: INTERVENTIONS:  - Monitor labs and assess patient for signs and symptoms of electrolyte imbalances  - Administer electrolyte replacement as ordered  - Monitor response to electrolyte replacements, including repeat lab results as appropriate  - Instruct patient on fluid and nutrition as appropriate  5/31/2025 1156 by Milla Mensah RN  Outcome: Adequate for Discharge  5/31/2025 1031 by Milla Mensah RN  Outcome: Progressing  Goal: Fluid balance maintained  Description: INTERVENTIONS:  - Monitor labs   - Monitor I/O and WT  - Instruct patient on fluid and nutrition as appropriate  - Assess for signs & symptoms of volume excess or deficit  5/31/2025 1156 by Milla Mensah RN  Outcome: Adequate for Discharge  5/31/2025 1031 by Milla Mensah RN  Outcome: Progressing

## 2025-05-31 NOTE — PLAN OF CARE
Problem: PAIN - ADULT  Goal: Verbalizes/displays adequate comfort level or baseline comfort level  Description: Interventions:  - Encourage patient to monitor pain and request assistance  - Assess pain using appropriate pain scale  - Administer analgesics as ordered based on type and severity of pain and evaluate response  - Implement non-pharmacological measures as appropriate and evaluate response  - Consider cultural and social influences on pain and pain management  - Notify physician/advanced practitioner if interventions unsuccessful or patient reports new pain  - Educate patient/family on pain management process including their role and importance of  reporting pain   - Provide non-pharmacologic/complimentary pain relief interventions  Outcome: Progressing     Problem: INFECTION - ADULT  Goal: Absence or prevention of progression during hospitalization  Description: INTERVENTIONS:  - Assess and monitor for signs and symptoms of infection  - Monitor lab/diagnostic results  - Monitor all insertion sites, i.e. indwelling lines, tubes, and drains  - Monitor endotracheal if appropriate and nasal secretions for changes in amount and color  - Norris appropriate cooling/warming therapies per order  - Administer medications as ordered  - Instruct and encourage patient and family to use good hand hygiene technique  - Identify and instruct in appropriate isolation precautions for identified infection/condition  Outcome: Progressing  Goal: Absence of fever/infection during neutropenic period  Description: INTERVENTIONS:  - Monitor WBC  - Perform strict hand hygiene  - Limit to healthy visitors only  - No plants, dried, fresh or silk flowers with alan in patient room  Outcome: Progressing     Problem: SAFETY ADULT  Goal: Patient will remain free of falls  Description: INTERVENTIONS:  - Educate patient/family on patient safety including physical limitations  - Instruct patient to call for assistance with activity   -  Consider consulting OT/PT to assist with strengthening/mobility based on AM PAC & JH-HLM score  - Consult OT/PT to assist with strengthening/mobility   - Keep Call bell within reach  - Keep bed low and locked with side rails adjusted as appropriate  - Keep care items and personal belongings within reach  - Initiate and maintain comfort rounds  - Make Fall Risk Sign visible to staff  - Obtain necessary fall risk management equipment: bed   - Apply yellow socks and bracelet for high fall risk patients  - Consider moving patient to room near nurses station  Outcome: Progressing  Goal: Maintain or return to baseline ADL function  Description: INTERVENTIONS:  -  Assess patient's ability to carry out ADLs; assess patient's baseline for ADL function and identify physical deficits which impact ability to perform ADLs (bathing, care of mouth/teeth, toileting, grooming, dressing, etc.)  - Assess/evaluate cause of self-care deficits   - Assess range of motion  - Assess patient's mobility; develop plan if impaired  - Assess patient's need for assistive devices and provide as appropriate  - Encourage maximum independence but intervene and supervise when necessary  - Involve family in performance of ADLs  - Assess for home care needs following discharge   - Consider OT consult to assist with ADL evaluation and planning for discharge  - Provide patient education as appropriate  - Monitor functional capacity and physical performance, use of AM PAC & JH-HLM   - Monitor gait, balance and fatigue with ambulation    Outcome: Progressing  Goal: Maintains/Returns to pre admission functional level  Description: INTERVENTIONS:  - Perform AM-PAC 6 Click Basic Mobility/ Daily Activity assessment daily.  - Set and communicate daily mobility goal to care team and patient/family/caregiver.   - Collaborate with rehabilitation services on mobility goals if consulted  - Perform Range of Motion  times a day.  - Out of bed for toileting  - Record  patient progress and toleration of activity level   Outcome: Progressing     Problem: DISCHARGE PLANNING  Goal: Discharge to home or other facility with appropriate resources  Description: INTERVENTIONS:  - Identify barriers to discharge w/patient and caregiver  - Arrange for needed discharge resources and transportation as appropriate  - Identify discharge learning needs (meds, wound care, etc.)  - Arrange for interpretive services to assist at discharge as needed  - Refer to Case Management Department for coordinating discharge planning if the patient needs post-hospital services based on physician/advanced practitioner order or complex needs related to functional status, cognitive ability, or social support system  Outcome: Progressing     Problem: Knowledge Deficit  Goal: Patient/family/caregiver demonstrates understanding of disease process, treatment plan, medications, and discharge instructions  Description: Complete learning assessment and assess knowledge base.  Interventions:  - Provide teaching at level of understanding  - Provide teaching via preferred learning methods  Outcome: Progressing     Problem: CARDIOVASCULAR - ADULT  Goal: Maintains optimal cardiac output and hemodynamic stability  Description: INTERVENTIONS:  - Monitor I/O, vital signs and rhythm  - Monitor for S/S and trends of decreased cardiac output  - Administer and titrate ordered vasoactive medications to optimize hemodynamic stability  - Assess quality of pulses, skin color and temperature  - Assess for signs of decreased coronary artery perfusion  - Instruct patient to report change in severity of symptoms  Outcome: Progressing  Goal: Absence of cardiac dysrhythmias or at baseline rhythm  Description: INTERVENTIONS:  - Continuous cardiac monitoring, vital signs, obtain 12 lead EKG if ordered  - Administer antiarrhythmic and heart rate control medications as ordered  - Monitor electrolytes and administer replacement therapy as  ordered  Outcome: Progressing     Problem: RESPIRATORY - ADULT  Goal: Achieves optimal ventilation and oxygenation  Description: INTERVENTIONS:  - Assess for changes in respiratory status  - Assess for changes in mentation and behavior  - Position to facilitate oxygenation and minimize respiratory effort  - Oxygen administered by appropriate delivery if ordered  - Initiate smoking cessation education as indicated  - Encourage broncho-pulmonary hygiene including cough, deep breathe, Incentive Spirometry  - Assess the need for suctioning and aspirate as needed  - Assess and instruct to report SOB or any respiratory difficulty  - Respiratory Therapy support as indicated  Outcome: Progressing

## 2025-05-31 NOTE — DISCHARGE SUMMARY
"Discharge Summary - Hospitalist   Name: Kourtney Jimenez 64 y.o. female I MRN: [id number]  Unit/Bed#: S -[facility] I Date of Admission: 5/27/2025   Date of Service: 5/31/2025 I Hospital Day: 4     Assessment & Plan  CHF exacerbation (HCC)  Wt Readings from Last 3 Encounters:   05/31/25 69.1 kg (152 lb 6.4 oz)   06/08/20 67.1 kg (148 lb)   05/11/20 67.1 kg (148 lb)     Lab Results   Component Value Date    LVEF 55 05/28/2025     (H) 05/27/2025     Presents with increased shortness of breath and dyspnea on exertion as well as bilateral lower extremity edema for the past few days however has been symptomatic since the \"holidays\" as she is unable to physically keep up with her normal activities of daily living.  Patient is currently volume overloaded.  Investigations:    Ultrasound BLE negative for DVT.  CXR (5/27/25) - increased interstitial lung markings, suggestive of pulmonary edema  CTA PE study (5/27/25) - no PE.  Moderate right effusion with small to moderate left effusion with groundglass haziness in the lungs, correlate with CHF  CXR (5/28/25) - improving pulmonary edema and pleural effusions   Echo 5/29 revealed EF 55% and severe mitral regurgitation with posterior prolapse leaflet.    GDMT:  Diuretic: Lasix 40 mg p.o. daily  B-Blocker: Lopressor 50 mg every 12 hours  ACE/ARB/ARNi: None  SGLT2i: None  Recommend outpatient follow-up for WILFREDO and CT surgery evaluation.  Started Eliquis 5 mg twice daily  Atrial fibrillation with RVR (HCC)  POA  EKG A-fib with RVR and PVCs.  [id number] 3  Likely 2/2 alcohol abuse and mitral regurgitation from prolapse.  Rate/Rhythm control: Starting on Lopressor 50 mg twice daily  AC: Heparin drip which will bridged to Eliquis.  Pleural effusion  Likely secondary to new onset CHF.  Treated with diuresis.  Alcohol use disorder  Alcohol use: Last drink 3 days ago however was a daily drinker baseline.  Alcohol Withdrawal Prevention:  Initiate CIWA protocol and administer " benzodiazepines as needed.  Arrange follow-up with primary care for ongoing management of nutritional status and electrolyte balance.  Anxiety and depression  Continue PTA antidepressants.  Hypomagnesemia (Resolved: 5/31/2025)  Recent Labs     05/29/25  0513 05/30/25  0452 05/31/25  0706   MG 1.8* 1.8* 2.2        Medical Problems       Resolved Problems  Date Reviewed: 6/8/2020          Resolved    Acute hypoxic respiratory failure (HCC) 5/30/2025     Resolved by  Armond Mares DO    Hypomagnesemia 5/31/2025     Resolved by  Jared Duque MD        Discharging Physician / Practitioner: Jared Duque MD  PCP: Valerie Aguilar MD  Admission Date:   Admission Orders (From admission, onward)       Ordered        05/27/25 1852  INPATIENT ADMISSION  Once                          Discharge Date: 05/31/25    Next Steps for Physician/AP Assuming Care:  Ensure patient's ability to remain on Eliquis given significant cost.    Test Results Pending at Discharge (will require follow up):  None    Medication Changes for Discharge & Rationale:   Started Eliquis 5 g twice daily  Started Lopressor 50 mg twice daily  Started furosemide 20 mg daily  See after visit summary for reconciled discharge medications provided to patient and/or family.     Consultations During Hospital Stay:  Cardiology    Procedures Performed:   None    Significant Findings / Test Results:   Known right pleural effusion.  Pulmonary edema pleural effusion since chest x-ray.  Moderate right effusion with small to moderate left effusion with groundglass haziness in the lungs, correlate with CHF  Nodular groundglass density right middle lobe, nonspecific may be due to edema, follow-up suggested at 3/6 months to demonstrate resolution    Incidental Findings:   Prominent subcarinal and prevascular lymph nodes, reactive   I reviewed the above mentioned incidental findings with the patient and/or family and they expressed understanding.    Hospital Course:   Principal  Problem:  New-onset A-fib and CHF secondary to mitral valve prolapse with severe MR.      Presentation:  Presented to [facility] ED with shortness of breath and A-fib with RVR.  Patient was initially seen at urgent care and was transferred to the emergency department via ambulance. Patient also reports new onset lower extremity edema.  She was initially requiring BiPAP for hypoxia however was quickly weaned to 2 L nasal cannula and then to room air and has been saturating well.  She has remained in atrial fibrillation however better rate control with Lopressor 50 mg daily.  She will follow-up with cardiology in the outpatient setting for further evaluation of possible mitral valve repair.  She was given sample for Eliquis with 30-day supply.      Findings:  US right upper quadrant  Result Date: 5/28/2025  Impression: Aside from a known right pleural effusion, unremarkable right upper quadrant ultrasound. Specifically, the liver appears normal without intrahepatic biliary dilation. Workstation performed: [id number]     XR chest portable  Result Date: 5/28/2025  Impression: Improving pulmonary edema and pleural effusions. Workstation performed: [id number]     XR chest 2 views  Result Date: 5/27/2025  Impression: Increased interstitial lung markings, suggestive of pulmonary edema. Enlarged cardiac silhouette. Workstation performed: [id number]     CTA chest pe study  Result Date: 5/27/2025  Impression: No pulmonary embolism seen Moderate right effusion with small to moderate left effusion with groundglass haziness in the lungs, correlate with CHF Nodular groundglass density right middle lobe, nonspecific may be due to edema, follow-up suggested at 3/6 months to demonstrate resolution Prominent subcarinal and prevascular lymph nodes, reactive The study was marked in EPIC for immediate notification. Computerized Assisted Algorithm (CAA) may have aided analysis of applicable images. Workstation performed: [id number]       No  "Chest XR results available for this patient.     Results from last 7 days   Lab Units 05/30/25  0452 05/29/25  0513 05/28/25  0433 05/27/25 2007 05/27/25  1333   WBC Thousand/uL 3.45* 3.27* 4.07* 4.91 5.05   HEMOGLOBIN g/dL 13.9 13.1 11.8 12.7 13.1   HEMATOCRIT % 41.9 40.5 37.7 38.9 40.5   PLATELETS Thousands/uL 158 120* 102* 124* 108*   SEGS PCT % 42*  --  60 61 73   LYMPHO PCT % 31  --  23 24 14   MONO PCT % 16*  --  11 12 13*   EOS PCT % 10*  --  4 2 0         Treatment:  Lopressor 50 twice daily  Lasix 40 mg daily  Eliquis 5 mg daily.      Disposition:  Discharged home    Discharge Day Visit / Exam:   Subjective: Patient seen and examined at bedside today. There were no overnight events.  Reports feeling generally well and was able to ambulate around the room without significant difficulty.  We discussed mitral valve prolapse and the need for follow-up with cardiology in the outpatient setting.  We also recommend continuing her medications as prescribed during her admission.  Otherwise denies any new symptoms.  Vitals: Blood Pressure: 106/74 (05/31/25 0752)  Pulse: 100 (05/31/25 0752)  Temperature: 98.3 °F (36.8 °C) (05/31/25 0752)  Temp Source: Oral (05/30/25 0707)  Respirations: 18 (05/30/25 0707)  Height: 5' 4\" (162.6 cm) (05/28/25 1814)  Weight - Scale: 69.1 kg (152 lb 6.4 oz) (05/31/25 0600)  SpO2: 97 % (05/31/25 0752)  Physical Exam  Constitutional:       Appearance: Normal appearance.   HENT:      Head: Normocephalic and atraumatic.     Eyes:      Extraocular Movements: Extraocular movements intact.      Conjunctiva/sclera: Conjunctivae normal.      Pupils: Pupils are equal, round, and reactive to light.       Cardiovascular:      Rate and Rhythm: Normal rate and regular rhythm.      Pulses: Normal pulses.      Heart sounds: Normal heart sounds. No murmur heard.     No gallop.   Pulmonary:      Effort: Pulmonary effort is normal. No respiratory distress.      Breath sounds: Normal breath sounds. No " wheezing.   Abdominal:      General: Bowel sounds are normal. There is no distension.      Palpations: Abdomen is soft.      Tenderness: There is no abdominal tenderness.     Neurological:      General: No focal deficit present.      Mental Status: She is alert and oriented to person, place, and time. Mental status is at baseline.     Psychiatric:         Mood and Affect: Mood normal.         Behavior: Behavior normal.          Discussion with Family: Updated  () at bedside.    Discharge instructions/Information to patient and family:   See after visit summary for information provided to patient and family.      Provisions for Follow-Up Care:  See after visit summary for information related to follow-up care and any pertinent home health orders.      Mobility at time of Discharge:   Basic Mobility Inpatient Raw Score: 22  JH-HLM Goal: 7: Walk 25 feet or more  JH-HLM Achieved: 8: Walk 250 feet ot more  HLM Goal achieved. Continue to encourage appropriate mobility.     Disposition:   Home    Planned Readmission: No    Administrative Statements   Discharge Statement:  I have spent a total time of 30 minutes in caring for this patient on the day of the visit/encounter. .    **Please Note: This note may have been constructed using a voice recognition system**

## 2025-05-31 NOTE — PROGRESS NOTES
Progress Note - Cardiology   Name: Kourtney Jimenez 64 y.o. female I MRN: [id number]  Unit/Bed#: S -01 I Date of Admission: 5/27/2025   Date of Service: 5/31/2025 I Hospital Day: 4     Assessment & Plan  CHF exacerbation (HCC)  Wt Readings from Last 3 Encounters:   05/31/25 69.1 kg (152 lb 6.4 oz)   06/08/20 67.1 kg (148 lb)   05/11/20 67.1 kg (148 lb)   Presented with cough, SOB, fatigue, and BLE edema for the past 3 days  Has been having CARBAJAL for the past month especially with going up stairs    CXR (5/27/25) - increased interstitial lung markings, suggestive of pulmonary edema  CTA PE study (5/27/25) - no PE.  Moderate right effusion with small to moderate left effusion with groundglass haziness in the lungs, correlate with CHF  CXR (5/28/25) - improving pulmonary edema and pleural effusions  With acute hypoxic respiratory failure.  Required BiPAP and ICU admission for hypoxia and increased work of breathing.  Downgraded from ICU on 5/28  Symptoms improved with IV diuretics  Secondary to MR from posterior mitral valve leaflet prolapse  Echo (5/28/25) - EF 55%.  Unable to assess diastolic function due to Afib.  Left atrium is moderately dilated.  Severe mitral regurgitation likely due to posterior mitral valve leaflet prolapse.  Mild TR.  Recommend WILFREDO for further evaluation estimated RVSP is 34.00 mmHg    Plan:  Continue Lopressor 50 mg BID  Continue PO Lasix 40 mg  Outpatient cardiology appointment set up for 6/10 at 1 PM  WILFREDO and CT surgery referral for MV repair will be discussed at this time  Cleared for discharge from cardiology perspective  Atrial fibrillation with RVR (HCC)  Has been having palpitations intermittently in the past  Presented with new onset Afib HR 150s-160s  Likely secondary to alcohol use  Likely precipitated CHF exacerbation  Initially received IV Cardizem 15 mg and was subsequently put on IV Cardizem gtt  Transitioned to IV metoprolol 2.5 mg q6h  [id number] score 3  Was on  "heparin drip  Received IV digoxin load    Plan:  Continue Lopressor 50 mg BID  Eliquis 5 mg BID  Anxiety and depression  No longer taking Lexapro  Alcohol use disorder  Drinks 1-2 glasses of wine daily.  Sometimes more.  For the past 20 years  Last drink on   Received phenobarbital in the ED  Per primary team  Hypokalemia  Lab Results   Component Value Date    K 4.4 2025    K 3.2 (L) 2025    K 3.7 2025     Continue to monitor and replete  Keep K > 4  Hypomagnesemia  Lab Results   Component Value Date    MG 2.2 2025    MG 1.8 (L) 2025    MG 1.8 (L) 2025     Continue to monitor and replete  Keep Mg > 2    Assessment:  Acute CHF - likely due to mitral regurgitation.   Afib with RVR - improving heart rate.      Plan:  Continue current medications.  Continue diuretics.  Will need WILFREDO as outpt, and probable referral to CT surgery for MV repair.  Stable for discharge from cardiac standpoint.         Subjective:    No significant events overnight.      Review of Systems   Cardiovascular:  Negative for chest pain, leg swelling and palpitations.   Respiratory:  Negative for shortness of breath.        Objective:   Vitals: Blood pressure 106/74, pulse 100, temperature 98.3 °F (36.8 °C), resp. rate 18, height 5' 4\" (1.626 m), weight 69.1 kg (152 lb 6.4 oz), SpO2 97%., Body mass index is 26.16 kg/m².,   Orthostatic Blood Pressures      Flowsheet Row Most Recent Value   Blood Pressure 106/74 filed at 2025 0752   Patient Position - Orthostatic VS Lying filed at 2025 0707           Systolic (24hrs), Av , Min:93 , Max:109     Diastolic (24hrs), Av, Min:56, Max:74      Intake/Output Summary (Last 24 hours) at 2025 0926  Last data filed at 2025 09  Gross per 24 hour   Intake 660 ml   Output --   Net 660 ml     Weight (last 2 days)       Date/Time Weight    25 0600 69.1 (152.4)    25 0453 69.8 (153.88)    25 0600 --     Weight: Refused to stand at " 05/29/25 0600                Telemetry Review: Afib    Physical Exam    Cardiovascular:      Rate and Rhythm: Normal rate. Rhythm irregularly irregular.      Heart sounds: Murmur heard.      Systolic murmur is present with a grade of 2/6.      No friction rub. No gallop.   Pulmonary:      Breath sounds: Normal breath sounds. No wheezing or rales.           Laboratory Results:        CBC with diff:   Results from last 7 days   Lab Units 05/30/25  0452 05/29/25  0513 05/28/25  0433 05/27/25 2007 05/27/25  1333   WBC Thousand/uL 3.45* 3.27* 4.07* 4.91 5.05   HEMOGLOBIN g/dL 13.9 13.1 11.8 12.7 13.1   HEMATOCRIT % 41.9 40.5 37.7 38.9 40.5   MCV fL 102* 106* 110* 105* 106*   PLATELETS Thousands/uL 158 120* 102* 124* 108*   RBC Million/uL 4.10 3.83 3.44* 3.71* 3.81   MCH pg 33.9 34.2 34.3 34.2 34.4*   MCHC g/dL 33.2 32.3 31.3* 32.6 32.3   RDW % 13.2 13.3 13.7 13.8 13.7   MPV fL 10.4 10.7 10.7 10.7 10.7   NRBC AUTO /100 WBCs 0  --  0 0 0         CMP:  Results from last 7 days   Lab Units 05/31/25  0706 05/30/25  0452 05/29/25  0513 05/28/25  1227 05/28/25 0433 05/27/25 2007 05/27/25  1333   POTASSIUM mmol/L 4.4 3.2* 3.7 5.1 3.3* 3.8 5.1   CHLORIDE mmol/L 103 102 105 103 106 107 104   CO2 mmol/L 31 29 25 23 21 23 22   BUN mg/dL 10 6 12 10 9 9 9   CREATININE mg/dL 0.58* 0.49* 0.55* 0.54* 0.53* 0.57* 0.66   CALCIUM mg/dL 8.4 8.1* 8.2* 8.5 7.8* 8.8 8.6   AST U/L  --   --   --   --  33 40* 51*   ALT U/L  --   --   --   --  43 54* 55*   ALK PHOS U/L  --   --   --   --  61 68 65   EGFR ml/min/1.73sq m 97 102 98 99 99 97 92         BMP:  Results from last 7 days   Lab Units 05/31/25  0706 05/30/25  0452 05/29/25  0513 05/28/25  1227 05/28/25  0433 05/27/25 2007 05/27/25  1333   POTASSIUM mmol/L 4.4 3.2* 3.7 5.1 3.3* 3.8 5.1   CHLORIDE mmol/L 103 102 105 103 106 107 104   CO2 mmol/L 31 29 25 23 21 23 22   BUN mg/dL 10 6 12 10 9 9 9   CREATININE mg/dL 0.58* 0.49* 0.55* 0.54* 0.53* 0.57* 0.66   CALCIUM mg/dL 8.4 8.1* 8.2* 8.5  7.8* 8.8 8.6       BNP:       Magnesium:   Results from last 7 days   Lab Units 05/31/25  0706 05/30/25  0452 05/29/25  0513 05/28/25  0433 05/27/25 2007   MAGNESIUM mg/dL 2.2 1.8* 1.8* 2.0 1.7*       Coags:   Results from last 7 days   Lab Units 05/30/25  1207 05/30/25  0452 05/29/25  1757 05/29/25  1158 05/29/25  0513 05/28/25  1008 05/28/25  0221 05/27/25  1902   PTT seconds 73* 102* 83* 70* 49* 77* 86* 27   INR   --   --   --   --   --   --   --  1.16       TSH:       Lipid Profile:             Cardiac testing:   No results found for this or any previous visit.    No results found for this or any previous visit.    No results found for this or any previous visit.    No results found for this or any previous visit.      Meds/Allergies   Current Facility-Administered Medications   Medication Dose Route Frequency Provider Last Rate    apixaban  5 mg Oral BID Jared Duque MD      folic acid  400 mcg Oral Daily Nicole Lovell MD      furosemide  40 mg Oral Daily Armond aMres DO      metoprolol tartrate  50 mg Oral Q12H [facility] Armond Mares DO      thiamine  100 mg Oral Daily Nicole Lovell MD            Medications Prior to Admission:     multivitamin (THERAGRAN) TABS    Assessment:  Principal Problem:    CHF exacerbation (HCC)  Active Problems:    Anxiety and depression    Atrial fibrillation with RVR (HCC)    Pleural effusion    Alcohol use disorder    Hypokalemia    Hypomagnesemia

## 2025-06-02 LAB
ATRIAL RATE: 277 BPM
QRS AXIS: 45 DEGREES
QRS AXIS: 79 DEGREES
QRSD INTERVAL: 78 MS
QRSD INTERVAL: 82 MS
QT INTERVAL: 304 MS
QT INTERVAL: 328 MS
QTC INTERVAL: 446 MS
QTC INTERVAL: 489 MS
T WAVE AXIS: -11 DEGREES
T WAVE AXIS: 40 DEGREES
VENTRICULAR RATE: 111 BPM
VENTRICULAR RATE: 156 BPM

## 2025-06-02 PROCEDURE — 93010 ELECTROCARDIOGRAM REPORT: CPT | Performed by: INTERNAL MEDICINE

## 2025-06-04 ENCOUNTER — TELEPHONE (OUTPATIENT)
Dept: FAMILY MEDICINE CLINIC | Facility: CLINIC | Age: 65
End: 2025-06-04

## 2025-06-04 NOTE — TELEPHONE ENCOUNTER
Spoke with pt and at this time she doesn't want to see a pcp since she has an appt with the Cardiologist on 6/10/25. In the past several years she has just been going to walk in centers for care.

## 2025-06-04 NOTE — TELEPHONE ENCOUNTER
----- Message from Beryl HUGGINS sent at 6/4/2025  9:28 AM EDT -----  Regarding: RE: CÉSAR Aguilar was her Primary Care Provider at one point in time I guess - so we just have to call her reach out. 1. Ask her if she is still planning on continuing her care with this office and or does she have another Primary Care Provider that we are not aware of at this point. Then we should do all of the things - get her scheduled and complete the Transitional Care Management call etc. HOWEVER - if there are other patient's we should prioritize those  Transitional Care Management spots for them and if not we can certainly then offer a spot to this patient.  ----- Message -----  From: Hellen Villareal LPN  Sent: 6/2/2025   4:27 PM EDT  To: Beryl Navarrete  Subject: TCM                                              Hello,So this pt is on our tableau report, but has no PCP listed and has not been seen in our office since 2020. What do we do here?

## 2025-06-10 ENCOUNTER — OFFICE VISIT (OUTPATIENT)
Dept: CARDIOLOGY CLINIC | Facility: CLINIC | Age: 65
End: 2025-06-10
Payer: MEDICARE

## 2025-06-10 VITALS
DIASTOLIC BLOOD PRESSURE: 64 MMHG | OXYGEN SATURATION: 93 % | TEMPERATURE: 98.7 F | HEIGHT: 64 IN | HEART RATE: 119 BPM | SYSTOLIC BLOOD PRESSURE: 100 MMHG | BODY MASS INDEX: 25.44 KG/M2 | WEIGHT: 149 LBS

## 2025-06-10 DIAGNOSIS — I48.0 PAROXYSMAL ATRIAL FIBRILLATION (HCC): ICD-10-CM

## 2025-06-10 DIAGNOSIS — J90 PLEURAL EFFUSION: ICD-10-CM

## 2025-06-10 DIAGNOSIS — I50.9 CHF (CONGESTIVE HEART FAILURE) (HCC): ICD-10-CM

## 2025-06-10 DIAGNOSIS — F10.90 ALCOHOL USE DISORDER: ICD-10-CM

## 2025-06-10 DIAGNOSIS — I48.91 ATRIAL FIBRILLATION WITH RVR (HCC): ICD-10-CM

## 2025-06-10 PROCEDURE — 99214 OFFICE O/P EST MOD 30 MIN: CPT

## 2025-06-10 NOTE — PROGRESS NOTES
Kourtney Jimenez  1960  [id number]  [facility] VERA Hernandez[address] [facility] [address] [phone]    1. Atrial fibrillation with RVR (HCC)        2. Paroxysmal atrial fibrillation (HCC)  WILFREDO    Cardioversion      3. CHF (congestive heart failure) ([facility])  Basic metabolic panel      4. Alcohol use disorder        5. Pleural effusion            Summary/Discussion:  Acute HFpEF  - s/p hospital admission on 5/27/2025. Cardiology was consulted     CXR (5/27/2025): increased interstitial lung markings, suggestive of pulmonary edema  CTA PE study (5/27/2025): no PE.  Moderate right effusion with small to moderate left effusion with groundglass haziness in the lungs, correlate with CHF  CXR (5/28/2025): improving pulmonary edema and pleural effusions  noted to have acute hypoxic respiratory failure.  Required BiPAP and ICU admission for hypoxia and increased work of breathing.  Downgraded from ICU on 5/28/2025  symptoms improved with IV diuretics  secondary to MR from posterior mitral valve leaflet prolapse and atrial fibrillation w/ RVR  echo (5/28/25): LVEF 55%.  Unable to assess diastolic function due to Afib.  Left atrium is moderately dilated.  Severe mitral regurgitation likely due to posterior mitral valve leaflet prolapse.  Mild TR  IP cardiology recommended outpatient WILFREDO for further evaluation of MR  - creatinine/potassium stable on labs from 5/31/2025  repeat BMP   - no clinical s/s of volume overload  will continue lasix 40 mg daily  short term follow up in 1 week for volume check   heart failure education provided  advised her to contact us for any acute weight gain and/or new/worsening HF s/s  high readmission risk secondary to atrial fibrillation with poorly controlled rates (see below)  - continue GDMT    Newly onset atrial fibrillation during recent hospitalization suspected secondary to alcohol use  Atrial fibrillation w/ RVR  - preserved LVEF   - EKG  today demonstrating atrial fibrillation w/ RVR,  bpm with evidence of heart rates ranging from low 100's-160's. Asymptomatic   - recommended ED evaluation for rhythm control however, patient refused. Detailed discussion had on the potential risks of remaining in atrial fibrillation w/ RVR. Patient reports understanding   - plan for WILFREDO/cardioversion this week to try and restore sinus rhythm. Message sent to our schedulers   - unable to titrate beta blocker due to baseline low blood pressures  - continue metoprolol 50 mg twice daily   - anticoagulation on eliquis 5 mg twice daily   - encourage alcohol cessation   - magnesium 2.2  - potassium 4.4  - plan for 2 week zio following WILFREDO/cardioversion    Mitral valve prolapse/regurgitation  -  severe MR by echo on 5/28/2025  - plan for WILFREDO as noted above    Pleural effusion  - moderate right effusion with small to moderate left effusion by CT chest  - CXR with reports of improving pulmonary edema and pleural effusions     Alcohol abuse:  - complete alcohol cessation encouraged       Interval History: Kourtney Jimenez is a 64 y.o. year old female with history mentioned in problem list who presents to the office today for a hospital follow up.     Today, she does not express any significant cardiac complaints. She denies any chest pain/pressure/discomfort or shortness of breath. She denies lower extremity edema, orthopnea, and PND. She denies lightheadedness, dizziness, and syncope. She denies palpitations. She is not able to monitor her heart rates or blood pressures at home due to not having a blood pressure cuff. Reports adherence to her medication regimen. Admits to daily alcohol use.       She will RTO in 1 week for a volume check/ s/p WILFREDO/cardioversion or sooner if necessary. She will call with any concerns.         Medical Problems       Problem List       Anxiety and depression    Anxiety, generalized    CHF exacerbation (HCC)    Wt Readings from Last 3  Encounters:   06/10/25 67.6 kg (149 lb)   25 69.1 kg (152 lb 6.4 oz)   20 67.1 kg (148 lb)                 Atrial fibrillation with RVR (HCC)    Pleural effusion    Alcohol use disorder    Hypokalemia        Past Medical History[1]  Social History     Socioeconomic History    Marital status: /Civil Union     Spouse name: Not on file    Number of children: Not on file    Years of education: Not on file    Highest education level: Not on file   Occupational History    Occupation: Retail    Tobacco Use    Smoking status: Former     Current packs/day: 0.00     Average packs/day: 0.5 packs/day for 15.0 years (7.5 ttl pk-yrs)     Types: Cigarettes     Start date: 1980     Quit date: 1995     Years since quittin.4    Smokeless tobacco: Never   Vaping Use    Vaping status: Never Used   Substance and Sexual Activity    Alcohol use: Yes     Alcohol/week: 3.0 standard drinks of alcohol     Types: 3 Glasses of wine per week    Drug use: Never    Sexual activity: Not Currently   Other Topics Concern    Not on file   Social History Narrative     - Trung    2 daughters    No pet    Retail Sales Management      Social Drivers of Health     Financial Resource Strain: Not on file   Food Insecurity: No Food Insecurity (2025)    Nursing - Inadequate Food Risk Classification     Worried About Running Out of Food in the Last Year: Not on file     Ran Out of Food in the Last Year: Not on file     Ran Out of Food in the Last Year: Never true   Transportation Needs: No Transportation Needs (2025)    Nursing - Transportation Risk Classification     Lack of Transportation: Not on file     Lack of Transportation: No   Physical Activity: Not on file   Stress: Not on file   Social Connections: Not on file   Intimate Partner Violence: Unknown (2025)    Nursing IPS     Feels Physically and Emotionally Safe: Not on file     Physically Hurt by Someone: Not on file     Humiliated or Emotionally  "Abused by Someone: Not on file     Physically Hurt by Someone: No     Hurt or Threatened by Someone: No   Housing Stability: Unknown (2025)    Nursing: Inadequate Housing Risk Classification     Has Housing: Not on file     Worried About Losing Housing: Not on file     Unable to Get Utilities: Not on file     Unable to Pay for Housing in the Last Year: No     Has Housin      Family History[2]  Past Surgical History[3]  Current Medications[4]  No Known Allergies    Labs:     Chemistry        Component Value Date/Time    K 4.4 2025 0706     2025 0706    CO2 31 2025 0706    BUN 10 2025 0706    CREATININE 0.58 (L) 2025 0706        Component Value Date/Time    CALCIUM 8.4 2025 0706    ALKPHOS 61 2025 0433    AST 33 2025 0433    ALT 43 2025 0433            No results found for: \"CHOL\"  No results found for: \"HDL\"  No results found for: \"LDLCALC\"  No results found for: \"TRIG\"  No results found for: \"CHOLHDL\"    Imaging: Echo complete w/ contrast if indicated  Result Date: 2025  Narrative:   Left Ventricle: Left ventricular cavity size is mildly dilated. Wall thickness is normal. The left ventricular ejection fraction is 55%. Systolic function is normal. Although no diagnostic regional wall motion abnormality was identified, this possibility cannot be completely excluded on the basis of this study.   Right Ventricle: Systolic function is mildly reduced.   Left Atrium: The atrium is moderately dilated.   Mitral Valve: There is mild annular calcification. The posterior leaflet is prolapsed. There is severe regurgitation with an anteriorly directed jet.   Tricuspid Valve: There is mild regurgitation.   Pericardium: There is a trivial pericardial effusion. Severe mitral regurgitation likely due to posterior MV leaflet prolapse. Recommend WILFREDO for further evaluation.     US right upper quadrant  Result Date: 2025  Narrative: RIGHT UPPER QUADRANT " ULTRASOUND INDICATION: elevated lft/bili. COMPARISON: None TECHNIQUE: Real-time ultrasound of the right upper quadrant was performed with a curvilinear transducer with both volumetric sweeps and still imaging techniques. FINDINGS: PANCREAS: Visualized portions of the pancreas are within normal limits. AORTA AND IVC: Visualized portions are normal for patient age. LIVER: Size: Within normal range. The liver measures 13.9 cm in the midclavicular line. Contour: Surface contour is smooth. Parenchyma: Echogenicity and echotexture are within normal limits. No liver mass identified. Limited imaging of the main portal vein shows it to be patent and hepatopetal. BILIARY: No gallbladder findings. No intrahepatic biliary dilatation. Not specifically measured due to poor visualization. No obvious dilation. No choledocholithiasis. KIDNEY: Right kidney measures 9.9 x 5.6 x 4.7 cm. Volume 136.8 mL Kidney within normal limits. ASCITES: None. Known right pleural effusion.     Impression: Aside from a known right pleural effusion, unremarkable right upper quadrant ultrasound. Specifically, the liver appears normal without intrahepatic biliary dilation. Workstation performed: [id number]     XR chest portable  Result Date: 5/28/2025  Narrative: XR CHEST PORTABLE INDICATION: hypoxia. COMPARISON: CXR and chest CT 5/27/2025. FINDINGS: Improving pulmonary edema. Improving pleural effusions. No pneumothorax. Mild cardiomegaly. Bones are unremarkable for age. Normal upper abdomen.     Impression: Improving pulmonary edema and pleural effusions. Workstation performed: [id number]     US bedside procedure  Result Date: 5/28/2025  Narrative: [id number]    VAS VENOUS DUPLEX -LOWER LIMB UNILATERAL  Result Date: 5/27/2025  Narrative: [facility] . ETHAN PEREZ is a 64 year old F who was referred by AZ OSORIO.    Indications: Patient presents with chronic bilateral lower extremity edema (left >  right) and recent onset of chest pains.  Operative History: Patient denies any cardiovascular procedures  Risk Factors: The patient reports smoking: quit >10yrs ago.    CONCLUSION:  RIGHT LOWER LIMB LIMITED: There is no evidence of thrombus in the common femoral vein.  Doppler evaluation shows a normal response to augmentation maneuvers.    LEFT LOWER LIMB: There is no evidence of acute or chronic deep vein thrombosis. There is no evidence of superficial thrombophlebitis. Doppler evaluation shows a normal response to augmentation maneuvers. Popliteal, posterior tibial and anterior tibial arterial Doppler waveforms are triphasic.   Technical findings were given to Dr. Angelita Beckham via secure chat.  SIGNATURE Signed by TUTU BUNDY MD, RPVI on 2025-05-27 20:30:34 [email or web address]#Images    XR chest 2 views  Result Date: 5/27/2025  Narrative: CHEST INDICATION: Chest Pain. COMPARISON: No similar prior studies available for comparison. TECHNIQUE: XR CHEST PA AND LATERAL - 4 images. FINDINGS: There are increased interstitial lung markings, suggestive of pulmonary edema. No pleural effusions. No evidence of pneumothorax. The cardiac silhouette is enlarged. The visualized osseous and soft tissue structures are unremarkable.     Impression: Increased interstitial lung markings, suggestive of pulmonary edema. Enlarged cardiac silhouette. Workstation performed: [id number]     US bedside procedure  Result Date: 5/27/2025  Narrative: [id number]    CTA chest pe study  Result Date: 5/27/2025  Narrative: CTA - CHEST WITH IV CONTRAST - PULMONARY ANGIOGRAM INDICATION: R/O PE. New afib + SOB + Leg swelling. COMPARISON: None. TECHNIQUE: CTA examination of the chest was performed using angiographic technique according to a protocol specifically tailored to evaluate for pulmonary embolism. Multiplanar 2D  reformatted images were created from the source data. In addition, coronal  3D MIP postprocessing was performed on the acquisition scanner. Radiation dose length product (DLP) for this visit: 343 mGy-cm. . This examination, like all CT scans performed in the [facility], was performed utilizing techniques to minimize radiation dose exposure, including the use of iterative reconstruction and automated exposure control. IV Contrast: 85 mL of iohexol (OMNIPAQUE) FINDINGS: PULMONARY ARTERIAL TREE: No pulmonary embolus. LUNGS: Groundglass haziness seen in the lungs with mild interlobular septal thickening And nodular groundglass density seen right middle lobe, image 116 series 301 PLEURA: Moderate right effusion seen Small to moderate left effusion seen HEART/GREAT VESSELS: Heart is unremarkable for patient's age. No thoracic aortic aneurysm. MEDIASTINUM AND VAUGHN: 7 mm para-aortic lymph nodes and subcarinal lymph nodes measuring up to 11 mm CHEST WALL AND LOWER NECK: Unremarkable. VISUALIZED STRUCTURES IN THE UPPER ABDOMEN: Hepatic steatosis. Adrenal gland appears unremarkable spleen appears unremarkable OSSEOUS STRUCTURES: No acute fracture or destructive osseous lesion.     Impression: No pulmonary embolism seen Moderate right effusion with small to moderate left effusion with groundglass haziness in the lungs, correlate with CHF Nodular groundglass density right middle lobe, nonspecific may be due to edema, follow-up suggested at 3/6 months to demonstrate resolution Prominent subcarinal and prevascular lymph nodes, reactive The study was marked in EPIC for immediate notification. Computerized Assisted Algorithm (CAA) may have aided analysis of applicable images. Workstation performed: [id number]       ECG:  trial fibrillation w/ RVR    Review of Systems   All other systems reviewed and are negative.      Vitals:    06/10/25 1246   BP: 100/64   Pulse: (!) 119   Temp: 98.7 °F (37.1 °C)   SpO2: 93%  "    Vitals:    06/10/25 1246   Weight: 67.6 kg (149 lb)     Height: 5' 4\" (162.6 cm)   Body mass index is 25.58 kg/m².    Physical Exam  Vitals and nursing note reviewed.   Constitutional:       General: She is not in acute distress.     Appearance: Normal appearance.   HENT:      Head: Normocephalic.      Nose: Nose normal.      Mouth/Throat:      Mouth: Mucous membranes are moist.      Pharynx: Oropharynx is clear.     Cardiovascular:      Rate and Rhythm: Tachycardia present. Rhythm irregular.      Heart sounds: Murmur heard.   Pulmonary:      Breath sounds: Decreased breath sounds present.     Musculoskeletal:         General: Normal range of motion.      Cervical back: Normal range of motion.      Right lower leg: No edema.      Left lower leg: No edema.     Skin:     General: Skin is warm and dry.     Neurological:      Mental Status: She is alert and oriented to person, place, and time.     Psychiatric:         Mood and Affect: Mood normal.         Behavior: Behavior normal.               [1] No past medical history on file.  [2]   Family History  Problem Relation Name Age of Onset    Dementia Mother Jackie     Depression Mother Jackie     Hypotension Mother Jackie     Coronary artery disease Father Stuart     Cancer Father Stuart         mouth cancer    No Known Problems Sister Beryl     Rheum arthritis Brother Stuart Sanders.     Anxiety disorder Daughter Marlin     Hypertension Sister Eli     No Known Problems Daughter Doris    [3]   Past Surgical History:  Procedure Laterality Date    WISDOM TOOTH EXTRACTION     [4]   Current Outpatient Medications:     apixaban (Eliquis) 5 mg, Take 1 tablet (5 mg total) by mouth 2 (two) times a day, Disp: 60 tablet, Rfl: 1    folic acid (FOLVITE) 400 mcg tablet, Take 1 tablet (400 mcg total) by mouth daily, Disp: 30 tablet, Rfl: 1    furosemide (LASIX) 40 mg tablet, Take 1 tablet (40 mg total) by mouth daily, Disp: 30 tablet, Rfl: 1    metoprolol tartrate (LOPRESSOR) 50 mg " tablet, Take 1 tablet (50 mg total) by mouth every 12 (twelve) hours, Disp: 60 tablet, Rfl: 1    thiamine 100 MG tablet, Take 1 tablet (100 mg total) by mouth daily, Disp: 30 tablet, Rfl: 1    multivitamin (THERAGRAN) TABS, Take 1 tablet by mouth in the morning. (Patient not taking: Reported on 6/10/2025), Disp: , Rfl:

## 2025-06-11 ENCOUNTER — TELEPHONE (OUTPATIENT)
Dept: NON INVASIVE DIAGNOSTICS | Facility: HOSPITAL | Age: 65
End: 2025-06-11

## 2025-06-12 ENCOUNTER — TELEPHONE (OUTPATIENT)
Dept: NON INVASIVE DIAGNOSTICS | Facility: HOSPITAL | Age: 65
End: 2025-06-12

## 2025-06-16 ENCOUNTER — ANESTHESIA (OUTPATIENT)
Dept: NON INVASIVE DIAGNOSTICS | Facility: HOSPITAL | Age: 65
End: 2025-06-16
Payer: MEDICARE

## 2025-06-16 ENCOUNTER — HOSPITAL ENCOUNTER (OUTPATIENT)
Dept: NON INVASIVE DIAGNOSTICS | Facility: HOSPITAL | Age: 65
Discharge: HOME | End: 2025-06-16
Payer: MEDICARE

## 2025-06-16 ENCOUNTER — ANESTHESIA EVENT (OUTPATIENT)
Dept: NON INVASIVE DIAGNOSTICS | Facility: HOSPITAL | Age: 65
End: 2025-06-16
Payer: MEDICARE

## 2025-06-16 ENCOUNTER — TELEPHONE (OUTPATIENT)
Dept: CARDIOLOGY CLINIC | Facility: CLINIC | Age: 65
End: 2025-06-16

## 2025-06-16 VITALS
SYSTOLIC BLOOD PRESSURE: 97 MMHG | OXYGEN SATURATION: 97 % | BODY MASS INDEX: 25.44 KG/M2 | HEIGHT: 64 IN | WEIGHT: 149.03 LBS | HEART RATE: 87 BPM | DIASTOLIC BLOOD PRESSURE: 53 MMHG

## 2025-06-16 VITALS — HEART RATE: 82 BPM | DIASTOLIC BLOOD PRESSURE: 62 MMHG | OXYGEN SATURATION: 97 % | SYSTOLIC BLOOD PRESSURE: 86 MMHG

## 2025-06-16 DIAGNOSIS — I48.0 PAROXYSMAL ATRIAL FIBRILLATION (HCC): ICD-10-CM

## 2025-06-16 LAB
AORTIC ROOT: 3 CM
ASCENDING AORTA: 3.8 CM
DOP CALC LVOT AREA: 3.14 CM2
DOP CALC LVOT DIAMETER: 2 CM
MV EROA: 0.49 CM2
RIGHT VENTRICLE ID DIMENSION: 3.2 CM
SL CV LV EF: 60

## 2025-06-16 PROCEDURE — 93005 ELECTROCARDIOGRAM TRACING: CPT

## 2025-06-16 PROCEDURE — 93312 ECHO TRANSESOPHAGEAL: CPT

## 2025-06-16 PROCEDURE — 92960 CARDIOVERSION ELECTRIC EXT: CPT

## 2025-06-16 PROCEDURE — 93312 ECHO TRANSESOPHAGEAL: CPT | Performed by: INTERNAL MEDICINE

## 2025-06-16 PROCEDURE — 93320 DOPPLER ECHO COMPLETE: CPT | Performed by: INTERNAL MEDICINE

## 2025-06-16 PROCEDURE — 92960 CARDIOVERSION ELECTRIC EXT: CPT | Performed by: INTERNAL MEDICINE

## 2025-06-16 PROCEDURE — 93325 DOPPLER ECHO COLOR FLOW MAPG: CPT | Performed by: INTERNAL MEDICINE

## 2025-06-16 PROCEDURE — 76377 3D RENDER W/INTRP POSTPROCES: CPT

## 2025-06-16 PROCEDURE — 76376 3D RENDER W/INTRP POSTPROCES: CPT | Performed by: INTERNAL MEDICINE

## 2025-06-16 RX ORDER — SODIUM CHLORIDE 9 MG/ML
INJECTION, SOLUTION INTRAVENOUS CONTINUOUS PRN
Status: DISCONTINUED | OUTPATIENT
Start: 2025-06-16 | End: 2025-06-16

## 2025-06-16 RX ORDER — PROPOFOL 10 MG/ML
INJECTION, EMULSION INTRAVENOUS AS NEEDED
Status: DISCONTINUED | OUTPATIENT
Start: 2025-06-16 | End: 2025-06-16

## 2025-06-16 RX ADMIN — PROPOFOL 100 MG: 10 INJECTION, EMULSION INTRAVENOUS at 13:59

## 2025-06-16 RX ADMIN — PROPOFOL 30 MG: 10 INJECTION, EMULSION INTRAVENOUS at 14:07

## 2025-06-16 RX ADMIN — PROPOFOL 50 MG: 10 INJECTION, EMULSION INTRAVENOUS at 14:03

## 2025-06-16 RX ADMIN — PROPOFOL 20 MG: 10 INJECTION, EMULSION INTRAVENOUS at 14:13

## 2025-06-16 RX ADMIN — SODIUM CHLORIDE: 0.9 INJECTION, SOLUTION INTRAVENOUS at 13:52

## 2025-06-16 RX ADMIN — PHENYLEPHRINE HYDROCHLORIDE 150 MCG: 50 INJECTION INTRAVENOUS at 13:59

## 2025-06-16 RX ADMIN — PROPOFOL 20 MG: 10 INJECTION, EMULSION INTRAVENOUS at 14:22

## 2025-06-16 RX ADMIN — PROPOFOL 20 MG: 10 INJECTION, EMULSION INTRAVENOUS at 14:16

## 2025-06-16 RX ADMIN — PHENYLEPHRINE HYDROCHLORIDE 40 MCG/MIN: 50 INJECTION INTRAVENOUS at 14:00

## 2025-06-16 RX ADMIN — PROPOFOL 20 MG: 10 INJECTION, EMULSION INTRAVENOUS at 14:10

## 2025-06-16 RX ADMIN — PROPOFOL 20 MG: 10 INJECTION, EMULSION INTRAVENOUS at 14:19

## 2025-06-16 NOTE — ANESTHESIA POSTPROCEDURE EVALUATION
Post-Op Assessment Note    CV Status:  Stable  Pain Score: 0    Pain management: adequate    Multimodal analgesia used between 6 hours prior to anesthesia start to PACU discharge    Mental Status:  Alert and awake   Hydration Status:  Euvolemic and stable   PONV Controlled:  Controlled   Airway Patency:  Patent  Two or more mitigation strategies used for obstructive sleep apnea   Post Op Vitals Reviewed: Yes    No anethesia notable event occurred.    Staff: CRNA           Last Filed PACU Vitals:  Vitals Value Taken Time   Temp 97    Pulse 82    BP 92/54    Resp 12    SpO2 94

## 2025-06-17 ENCOUNTER — TELEPHONE (OUTPATIENT)
Dept: NON INVASIVE DIAGNOSTICS | Facility: HOSPITAL | Age: 65
End: 2025-06-17

## 2025-06-17 LAB
ATRIAL RATE: 79 BPM
P AXIS: 49 DEGREES
PR INTERVAL: 156 MS
QRS AXIS: 39 DEGREES
QRS AXIS: 68 DEGREES
QRSD INTERVAL: 86 MS
QRSD INTERVAL: 88 MS
QT INTERVAL: 322 MS
QT INTERVAL: 394 MS
QTC INTERVAL: 451 MS
QTC INTERVAL: 491 MS
T WAVE AXIS: 22 DEGREES
T WAVE AXIS: 46 DEGREES
VENTRICULAR RATE: 140 BPM
VENTRICULAR RATE: 79 BPM

## 2025-06-17 PROCEDURE — 93010 ELECTROCARDIOGRAM REPORT: CPT | Performed by: INTERNAL MEDICINE

## 2025-06-17 NOTE — ANESTHESIA POSTPROCEDURE EVALUATION
Post-Op Assessment Note    CV Status:  Stable  Pain Score: 2    Pain management: adequate       Mental Status:  Alert and awake   Hydration Status:  Stable   PONV Controlled:  None   Airway Patency:  Patent  Airway: intubated     Post Op Vitals Reviewed: Yes    No anethesia notable event occurred.    Staff: Anesthesiologist           Last Filed PACU Vitals:  Vitals Value Taken Time   Temp     Pulse     BP     Resp     SpO2

## 2025-06-17 NOTE — ANESTHESIA PREPROCEDURE EVALUATION
Procedure:  WILFREDO    Relevant Problems   CARDIO   (+) Atrial fibrillation with RVR (HCC)   (+) CHF exacerbation (HCC)      NEURO/PSYCH   (+) Anxiety and depression   (+) Anxiety, generalized      PULMONARY   (+) Pleural effusion        Physical Exam    Airway     Mallampati score: II  TM Distance: >3 FB  Neck ROM: full  Upper bite lip test: I  Mouth opening: >= 4 cm      Cardiovascular  Rhythm: regular, Rate: normal, Pulse is strong. No JVD    Dental   No notable dental hx     Pulmonary   Breath sounds clear to auscultation    Neurological    She appears awake, alert and oriented x3.      Other Findings  post-pubertal.           NPO Status:  Vitals Value Taken Time   Date of last liquid 06/16/25 06/16/25 13:08   Time of last liquid     Date of last solid 06/15/25 06/16/25 13:08   Time of last solid

## 2025-06-17 NOTE — ANESTHESIA PREPROCEDURE EVALUATION
Procedure:  WILFREDO    Relevant Problems   CARDIO   (+) Atrial fibrillation with RVR (HCC)   (+) CHF exacerbation (HCC)      NEURO/PSYCH   (+) Anxiety and depression   (+) Anxiety, generalized      PULMONARY   (+) Pleural effusion        Physical Exam    Airway     Mallampati score: II  TM Distance: >3 FB  Neck ROM: full  Upper bite lip test: I      Cardiovascular  Rhythm: irregular, Rate: tachycardia, Pulse is palpable.     Dental   No notable dental hx     Pulmonary   Breath sounds clear to auscultation    Neurological    She appears awake, alert and oriented x3.      Other Findings  post-pubertal.      Anesthesia Plan  ASA Score- 3     Anesthesia Type- IV sedation with anesthesia with ASA Monitors.         Additional Monitors:     Airway Plan: natural airway.           Plan Factors-Exercise tolerance (METS): >4 METS.    Chart reviewed. EKG reviewed. Imaging results reviewed. Existing labs reviewed. Patient summary reviewed.    Patient is not a current smoker.  Patient did not smoke on day of surgery.    Obstructive sleep apnea risk education given perioperatively.        Induction- intravenous.    Postoperative Plan- .   Monitoring Plan - Monitoring plan - standard ASA monitoring  Post Operative Pain Plan - non-opiod analgesics    Perioperative Resuscitation Plan - Level 1 - Full Code.       Informed Consent- Anesthetic plan and risks discussed with patient.  I personally reviewed this patient with the CRNA. Discussed and agreed on the Anesthesia Plan with the CRNA..      NPO Status:  Vitals Value Taken Time   Date of last liquid 06/16/25 06/16/25 13:08   Time of last liquid     Date of last solid 06/15/25 06/16/25 13:08   Time of last solid

## 2025-06-19 ENCOUNTER — TELEPHONE (OUTPATIENT)
Age: 65
End: 2025-06-19

## 2025-06-19 NOTE — TELEPHONE ENCOUNTER
Received a call from spouse, Kourtney not feeling well unable to call into office. Spouse was trying to just help.     Advised unable to assist him at this time due to no HIPAA form on file, advised to have Kourtney reach out to give verbal consent or call with her. Verbally understood

## 2025-06-23 ENCOUNTER — TELEPHONE (OUTPATIENT)
Age: 65
End: 2025-06-23

## 2025-06-23 NOTE — TELEPHONE ENCOUNTER
Caller: Trung, spouse    Doctor: Dr. Zhao in hosp/ [facility]    Reason for call: Pt asking if Jantoven could replace the Eliquis ?  Prescription plan will cover that with a reasonable cost.    Pt will run out before 6/30/25 [facility]    Call back#:  wife - [phone]

## 2025-06-30 ENCOUNTER — TELEPHONE (OUTPATIENT)
Dept: CARDIAC SURGERY | Facility: CLINIC | Age: 65
End: 2025-06-30

## 2025-06-30 ENCOUNTER — OFFICE VISIT (OUTPATIENT)
Dept: CARDIOLOGY CLINIC | Facility: CLINIC | Age: 65
End: 2025-06-30
Payer: MEDICARE

## 2025-06-30 VITALS
SYSTOLIC BLOOD PRESSURE: 112 MMHG | TEMPERATURE: 97.8 F | BODY MASS INDEX: 24.62 KG/M2 | HEIGHT: 64 IN | OXYGEN SATURATION: 96 % | DIASTOLIC BLOOD PRESSURE: 68 MMHG | WEIGHT: 144.2 LBS | HEART RATE: 68 BPM

## 2025-06-30 DIAGNOSIS — I48.91 A-FIB (HCC): ICD-10-CM

## 2025-06-30 DIAGNOSIS — I50.9 CHF (CONGESTIVE HEART FAILURE) (HCC): ICD-10-CM

## 2025-06-30 DIAGNOSIS — I34.0 SEVERE MITRAL REGURGITATION: ICD-10-CM

## 2025-06-30 DIAGNOSIS — J90 PLEURAL EFFUSION: ICD-10-CM

## 2025-06-30 PROCEDURE — 99214 OFFICE O/P EST MOD 30 MIN: CPT

## 2025-06-30 RX ORDER — METOPROLOL TARTRATE 50 MG
50 TABLET ORAL EVERY 12 HOURS SCHEDULED
Qty: 180 TABLET | Refills: 3 | Status: SHIPPED | OUTPATIENT
Start: 2025-06-30

## 2025-06-30 RX ORDER — FUROSEMIDE 40 MG/1
40 TABLET ORAL DAILY
Qty: 90 TABLET | Refills: 1 | Status: SHIPPED | OUTPATIENT
Start: 2025-06-30

## 2025-06-30 NOTE — TELEPHONE ENCOUNTER
Called patient and left a message to schedule appt with Dr. Forte, please transfer call to the office, Thank you!

## 2025-06-30 NOTE — PROGRESS NOTES
Kourtney Jimenez  1960  [id number]  [facility]  [address] [facility] [address] [phone]    1. A-fib ([facility])  POCT ECG    apixaban (Eliquis) 5 mg    furosemide (LASIX) 40 mg tablet    metoprolol tartrate (LOPRESSOR) 50 mg tablet      2. CHF (congestive heart failure) ([facility])  furosemide (LASIX) 40 mg tablet    metoprolol tartrate (LOPRESSOR) 50 mg tablet      3. Severe mitral regurgitation  Ambulatory referral to Cardiovascular Surgery      4. Pleural effusion            Summary/Discussion:  HFpEF  - s/p hospital admission on 5/27/2025. Cardiology was consulted     CXR (5/27/2025): increased interstitial lung markings, suggestive of pulmonary edema  CTA PE study (5/27/2025): no PE.  Moderate right effusion with small to moderate left effusion with groundglass haziness in the lungs, correlate with CHF  CXR (5/28/2025): improving pulmonary edema and pleural effusions  noted to have acute hypoxic respiratory failure.  Required BiPAP and ICU admission for hypoxia and increased work of breathing.  Downgraded from ICU on 5/28/2025  symptoms improved with IV diuretics  secondary to MR from posterior mitral valve leaflet prolapse and atrial fibrillation w/ RVR  echo (5/28/25): LVEF 55%.  Unable to assess diastolic function due to Afib.  Left atrium is moderately dilated.  Severe mitral regurgitation likely due to posterior mitral valve leaflet prolapse.  Mild TR  IP cardiology recommended outpatient WILFREDO for further evaluation of MR  - creatinine/potassium stable on labs from 5/31/2025  repeat BMP ordered during last office visit. Not completed   - no clinical s/s of volume overload  will continue lasix 40 mg daily at this time. Likely be able to decrease to 20 mg daily in the future   heart failure education provided  advised her to contact us for any acute weight gain and/or new/worsening HF s/s  - continue metoprolol 50 mg twice daily     Newly diagnosed atrial  fibrillation during recent hospitalization   - noted to be in atrial fibrillation w/ RVR during last office visit in which ED evaluation was advised. She declined. Subsequently underwent WILFREDO/cardioversion on 6/16/2025. Converted to sinus rhythm   - remains in sinus rhythm based on office EKG  - continue metoprolol 50 mg twice daily   - anticoagulation on eliquis 5 mg twice daily. Will remain on uninterrupted anticoagulation for 4 weeks post cardioversion  - encourage alcohol cessation   - reports mild symptoms of palpitations and fatigue when in atrial fibrillation  - plan for 2 week zio for any recurrent atrial fibrillation to assess burden/rate control   - encouraged her to monitor her heart rates at home and to notify the office for recurrence     Mitral valve prolapse/regurgitation  - severe MR by echo on 5/28/2025  - WILFREDO (6/16/2025): the lateral segment (P1) and middle segment (P2) of the posterior leaflet are prolapsed. Coaptation gap is the worst at lateral A2/P2 (2 mm). There is severe regurgitation with an eccentrically directed jet. MV ERO is 0.49 cm2  - recommend CT surgery evaluation for consideration of MV repair     Pleural effusion  - moderate right effusion with small to moderate left effusion by CT chest  - CXR with reports of improving pulmonary edema and pleural effusions     Alcohol abuse:  - complete alcohol cessation encouraged       Interval History: Kourtney Jimenez is a 64 y.o. year old female with history mentioned in problem list who presents to the office today for a short term follow up s/p WILFREDO/cardioversion.     Today, she does not express any significant cardiac complaints. She denies any chest pain/pressure/discomfort or shortness of breath. She denies lower extremity edema, orthopnea, and PND. She denies lightheadedness, dizziness, and syncope. She denies palpitations currently. Reports adherence to her medication regimen.       She will RTO in 13 months or sooner if necessary. She  will call with any concerns.         Medical Problems       Problem List       Anxiety and depression    Anxiety, generalized    CHF exacerbation (HCC)    Wt Readings from Last 3 Encounters:   25 65.4 kg (144 lb 3.2 oz)   25 67.6 kg (149 lb 0.5 oz)   06/10/25 67.6 kg (149 lb)                 Atrial fibrillation with RVR (HCC)    Pleural effusion    Alcohol use disorder    Hypokalemia        Past Medical History[1]  Social History     Socioeconomic History    Marital status: /Civil Union     Spouse name: Not on file    Number of children: Not on file    Years of education: Not on file    Highest education level: Not on file   Occupational History    Occupation: Retail    Tobacco Use    Smoking status: Former     Current packs/day: 0.00     Average packs/day: 0.5 packs/day for 15.0 years (7.5 ttl pk-yrs)     Types: Cigarettes     Start date: 1980     Quit date: 1995     Years since quittin.5    Smokeless tobacco: Never   Vaping Use    Vaping status: Never Used   Substance and Sexual Activity    Alcohol use: Yes     Alcohol/week: 3.0 standard drinks of alcohol     Types: 3 Glasses of wine per week    Drug use: Never    Sexual activity: Not Currently   Other Topics Concern    Not on file   Social History Narrative     - [place]    2 daughters    No pet    Retail Sales Management      Social Drivers of Health     Financial Resource Strain: Not on file   Food Insecurity: No Food Insecurity (2025)    Nursing - Inadequate Food Risk Classification     Worried About Running Out of Food in the Last Year: Not on file     Ran Out of Food in the Last Year: Not on file     Ran Out of Food in the Last Year: Never true   Transportation Needs: No Transportation Needs (2025)    Nursing - Transportation Risk Classification     Lack of Transportation: Not on file     Lack of Transportation: No   Physical Activity: Not on file   Stress: Not on file   Social Connections: Not on file   Intimate  "Partner Violence: Unknown (6/16/2025)    Nursing IPS     Feels Physically and Emotionally Safe: Not on file     Physically Hurt by Someone: Not on file     Humiliated or Emotionally Abused by Someone: Not on file     Physically Hurt by Someone: No     Hurt or Threatened by Someone: No   Housing Stability: Unknown (6/16/2025)    Nursing: Inadequate Housing Risk Classification     Has Housing: Not on file     Worried About Losing Housing: Not on file     Unable to Get Utilities: Not on file     Unable to Pay for Housing in the Last Year: No     Has Housing: No      Family History[2]  Past Surgical History[3]  Current Medications[4]  No Known Allergies    Labs:     Chemistry        Component Value Date/Time    K 4.4 05/31/2025 0706     05/31/2025 0706    CO2 31 05/31/2025 0706    BUN 10 05/31/2025 0706    CREATININE 0.58 (L) 05/31/2025 0706        Component Value Date/Time    CALCIUM 8.4 05/31/2025 0706    ALKPHOS 61 05/28/2025 0433    AST 33 05/28/2025 0433    ALT 43 05/28/2025 0433            No results found for: \"CHOL\"  No results found for: \"HDL\"  No results found for: \"LDLCALC\"  No results found for: \"TRIG\"  No results found for: \"CHOLHDL\"    Imaging: Echo complete w/ contrast if indicated  Result Date: 5/29/2025  Narrative:   Left Ventricle: Left ventricular cavity size is mildly dilated. Wall thickness is normal. The left ventricular ejection fraction is 55%. Systolic function is normal. Although no diagnostic regional wall motion abnormality was identified, this possibility cannot be completely excluded on the basis of this study.   Right Ventricle: Systolic function is mildly reduced.   Left Atrium: The atrium is moderately dilated.   Mitral Valve: There is mild annular calcification. The posterior leaflet is prolapsed. There is severe regurgitation with an anteriorly directed jet.   Tricuspid Valve: There is mild regurgitation.   Pericardium: There is a trivial pericardial effusion. Severe mitral " regurgitation likely due to posterior MV leaflet prolapse. Recommend WILFREDO for further evaluation.     US right upper quadrant  Result Date: 5/28/2025  Narrative: RIGHT UPPER QUADRANT ULTRASOUND INDICATION: elevated lft/bili. COMPARISON: None TECHNIQUE: Real-time ultrasound of the right upper quadrant was performed with a curvilinear transducer with both volumetric sweeps and still imaging techniques. FINDINGS: PANCREAS: Visualized portions of the pancreas are within normal limits. AORTA AND IVC: Visualized portions are normal for patient age. LIVER: Size: Within normal range. The liver measures 13.9 cm in the midclavicular line. Contour: Surface contour is smooth. Parenchyma: Echogenicity and echotexture are within normal limits. No liver mass identified. Limited imaging of the main portal vein shows it to be patent and hepatopetal. BILIARY: No gallbladder findings. No intrahepatic biliary dilatation. Not specifically measured due to poor visualization. No obvious dilation. No choledocholithiasis. KIDNEY: Right kidney measures 9.9 x 5.6 x 4.7 cm. Volume 136.8 mL Kidney within normal limits. ASCITES: None. Known right pleural effusion.     Impression: Aside from a known right pleural effusion, unremarkable right upper quadrant ultrasound. Specifically, the liver appears normal without intrahepatic biliary dilation. Workstation performed: [id number]     XR chest portable  Result Date: 5/28/2025  Narrative: XR CHEST PORTABLE INDICATION: hypoxia. COMPARISON: CXR and chest CT 5/27/2025. FINDINGS: Improving pulmonary edema. Improving pleural effusions. No pneumothorax. Mild cardiomegaly. Bones are unremarkable for age. Normal upper abdomen.     Impression: Improving pulmonary edema and pleural effusions. Workstation performed: [id number]     US bedside procedure  Result Date: 5/28/2025  Narrative: [id number]    VAS VENOUS DUPLEX -LOWER LIMB UNILATERAL  Result Date: 5/27/2025  Narrative: THE  [facility] REPORT . ETHAN PEREZ is a 64 year old F who was referred by ANGELITA OSORIO.    Indications: Patient presents with chronic bilateral lower extremity edema (left > right) and recent onset of chest pains.  Operative History: Patient denies any cardiovascular procedures  Risk Factors: The patient reports smoking: quit >10yrs ago.    CONCLUSION:  RIGHT LOWER LIMB LIMITED: There is no evidence of thrombus in the common femoral vein.  Doppler evaluation shows a normal response to augmentation maneuvers.    LEFT LOWER LIMB: There is no evidence of acute or chronic deep vein thrombosis. There is no evidence of superficial thrombophlebitis. Doppler evaluation shows a normal response to augmentation maneuvers. Popliteal, posterior tibial and anterior tibial arterial Doppler waveforms are triphasic.   Technical findings were given to Dr. Angelita Osorio via secure chat.  SIGNATURE Signed by TUTU BUNDY MD, VI on 2025-05-27 20:30:34 [email or web address]#Images    XR chest 2 views  Result Date: 5/27/2025  Narrative: CHEST INDICATION: Chest Pain. COMPARISON: No similar prior studies available for comparison. TECHNIQUE: XR CHEST PA AND LATERAL - 4 images. FINDINGS: There are increased interstitial lung markings, suggestive of pulmonary edema. No pleural effusions. No evidence of pneumothorax. The cardiac silhouette is enlarged. The visualized osseous and soft tissue structures are unremarkable.     Impression: Increased interstitial lung markings, suggestive of pulmonary edema. Enlarged cardiac silhouette. Workstation performed: [id number]     US bedside procedure  Result Date: 5/27/2025  Narrative: [id number]    CTA chest pe study  Result Date: 5/27/2025  Narrative: CTA - CHEST WITH IV CONTRAST - PULMONARY ANGIOGRAM INDICATION: R/O PE. New afib + SOB + Leg swelling. COMPARISON: None.  TECHNIQUE: CTA examination of the chest was performed using angiographic technique according to a protocol specifically tailored to evaluate for pulmonary embolism. Multiplanar 2D reformatted images were created from the source data. In addition, coronal  3D MIP postprocessing was performed on the acquisition scanner. Radiation dose length product (DLP) for this visit: 343 mGy-cm. . This examination, like all CT scans performed in the [facility], was performed utilizing techniques to minimize radiation dose exposure, including the use of iterative reconstruction and automated exposure control. IV Contrast: 85 mL of iohexol (OMNIPAQUE) FINDINGS: PULMONARY ARTERIAL TREE: No pulmonary embolus. LUNGS: Groundglass haziness seen in the lungs with mild interlobular septal thickening And nodular groundglass density seen right middle lobe, image 116 series 301 PLEURA: Moderate right effusion seen Small to moderate left effusion seen HEART/GREAT VESSELS: Heart is unremarkable for patient's age. No thoracic aortic aneurysm. MEDIASTINUM AND VAUGHN: 7 mm para-aortic lymph nodes and subcarinal lymph nodes measuring up to 11 mm CHEST WALL AND LOWER NECK: Unremarkable. VISUALIZED STRUCTURES IN THE UPPER ABDOMEN: Hepatic steatosis. Adrenal gland appears unremarkable spleen appears unremarkable OSSEOUS STRUCTURES: No acute fracture or destructive osseous lesion.     Impression: No pulmonary embolism seen Moderate right effusion with small to moderate left effusion with groundglass haziness in the lungs, correlate with CHF Nodular groundglass density right middle lobe, nonspecific may be due to edema, follow-up suggested at 3/6 months to demonstrate resolution Prominent subcarinal and prevascular lymph nodes, reactive The study was marked in EPIC for immediate notification. Computerized Assisted Algorithm (CAA) may have aided analysis of applicable images. Workstation performed: [id number]       ECG:  sinus rhythm, ns  "ST abnormality     Review of Systems   All other systems reviewed and are negative.      Vitals:    06/30/25 0801   BP: 112/68   Pulse: 68   Temp: 97.8 °F (36.6 °C)   SpO2: 96%     Vitals:    06/30/25 0801   Weight: 65.4 kg (144 lb 3.2 oz)     Height: 5' 4\" (162.6 cm)   Body mass index is 24.75 kg/m².    Physical Exam  Vitals and nursing note reviewed.   Constitutional:       General: She is not in acute distress.     Appearance: Normal appearance.   HENT:      Head: Normocephalic.      Nose: Nose normal.      Mouth/Throat:      Mouth: Mucous membranes are moist.      Pharynx: Oropharynx is clear.     Cardiovascular:      Rate and Rhythm: Normal rate and regular rhythm.      Heart sounds: Murmur heard.   Pulmonary:      Breath sounds: Decreased breath sounds present.     Musculoskeletal:         General: Normal range of motion.      Cervical back: Normal range of motion.      Right lower leg: No edema.      Left lower leg: No edema.     Skin:     General: Skin is warm and dry.     Neurological:      Mental Status: She is alert and oriented to person, place, and time.     Psychiatric:         Mood and Affect: Mood normal.         Behavior: Behavior normal.               [1] No past medical history on file.  [2]   Family History  Problem Relation Name Age of Onset    Dementia Mother Jackie     Depression Mother Jackie     Hypotension Mother Jackie     Coronary artery disease Father Stuart     Cancer Father Stuart         mouth cancer    No Known Problems Sister Beryl     Rheum arthritis Brother Stuart Saunders     Anxiety disorder Daughter Marlin     Hypertension Sister Eli     No Known Problems Daughter Doris    [3]   Past Surgical History:  Procedure Laterality Date    WISDOM TOOTH EXTRACTION     [4]   Current Outpatient Medications:     apixaban (Eliquis) 5 mg, Take 1 tablet (5 mg total) by mouth 2 (two) times a day, Disp: 180 tablet, Rfl: 3    folic acid (FOLVITE) 400 mcg tablet, Take 1 tablet (400 mcg total) by mouth " daily, Disp: 30 tablet, Rfl: 1    furosemide (LASIX) 40 mg tablet, Take 1 tablet (40 mg total) by mouth daily, Disp: 90 tablet, Rfl: 1    metoprolol tartrate (LOPRESSOR) 50 mg tablet, Take 1 tablet (50 mg total) by mouth every 12 (twelve) hours, Disp: 180 tablet, Rfl: 3    multivitamin (THERAGRAN) TABS, Take 1 tablet by mouth in the morning., Disp: , Rfl:     thiamine 100 MG tablet, Take 1 tablet (100 mg total) by mouth daily, Disp: 30 tablet, Rfl: 1

## 2025-07-01 PROCEDURE — 93000 ELECTROCARDIOGRAM COMPLETE: CPT

## 2025-07-08 ENCOUNTER — CONSULT (OUTPATIENT)
Dept: CARDIAC SURGERY | Facility: CLINIC | Age: 65
End: 2025-07-08
Payer: MEDICARE

## 2025-07-08 VITALS
SYSTOLIC BLOOD PRESSURE: 85 MMHG | WEIGHT: 146.2 LBS | BODY MASS INDEX: 24.96 KG/M2 | DIASTOLIC BLOOD PRESSURE: 52 MMHG | HEART RATE: 78 BPM | HEIGHT: 64 IN | OXYGEN SATURATION: 98 %

## 2025-07-08 DIAGNOSIS — I34.0 SEVERE MITRAL REGURGITATION: ICD-10-CM

## 2025-07-08 DIAGNOSIS — Z13.6 ENCOUNTER FOR SCREENING FOR STENOSIS OF CAROTID ARTERY: ICD-10-CM

## 2025-07-08 DIAGNOSIS — I34.81 MITRAL ANNULAR CALCIFICATION: Primary | ICD-10-CM

## 2025-07-08 PROCEDURE — 99204 OFFICE O/P NEW MOD 45 MIN: CPT | Performed by: STUDENT IN AN ORGANIZED HEALTH CARE EDUCATION/TRAINING PROGRAM

## 2025-07-08 NOTE — PROGRESS NOTES
Consultation - Cardiothoracic Surgery   Kourtney Jimenez 64 y.o. female MRN: [id number]    Physician Requesting Consult: DAVID Davis    Reason for Consult / Principal Problem: Mitral regurgitation    History of Present Illness: Kourtney Jimenez is a 64 y.o. year old female referred for surgical consultation. She was admitted to the hospital in May 2025 with new onset acute CHF and Afib with RVR. Echo demonstrated prolapsed posterior mitral prolapse with severe regurgitation. She was treated with IV diuretics, IV Cardizem, transitioned to oral Metoprolol and Furosemide and anticoagulated with Eliquis. She was discharged and scheduled for out patient Cardiology follow-up. DCCV was performed on 6/16/25, successful conversion to NSR. WILFREDO demonstrates EF 60%, prolapse of mitral P1, P2 with severe regurgitation.     Patient is accompanied by her  today. She reports intermittent palpitations for over a year. She reports progressive fatigue, change in activity tolerance and CARBAJAL after the holidays. She was ill in April with pneumonia. She states she never really recovered from the PNA and continued with SOB and LE edema, resulting in her recent hospitalization. Her edema is improved.She still has fatigue, CARBAJAL and occasional chest pressure.    No prior cardiac history. No major surgeries.  FH is notable for father with CAD.  Former smoker, quit in 1995.  Admits to daily wine, several glasses a day. No drug use.  Last dental visit 2 yeas ago,       Past Medical History:  No past medical history on file.    Past Surgical History:   Past Surgical History:   Procedure Laterality Date    WISDOM TOOTH EXTRACTION         Family History:  Family History   Problem Relation Name Age of Onset    Dementia Mother Jackie     Depression Mother Jackie     Hypotension Mother Jackie     Coronary artery disease Father Stuart     Cancer Father Stuart         mouth cancer    No Known Problems Sister Beryl     Rheum arthritis Brother Stuart  Jr.     Anxiety disorder Daughter Marlin     Hypertension Sister Eli     No Known Problems Daughter Doris        Social History:    Social History     Substance and Sexual Activity   Alcohol Use Yes    Alcohol/week: 3.0 standard drinks of alcohol    Types: 3 Glasses of wine per week     Social History     Substance and Sexual Activity   Drug Use Never     Social History     Tobacco Use   Smoking Status Former    Current packs/day: 0.00    Average packs/day: 0.5 packs/day for 15.0 years (7.5 ttl pk-yrs)    Types: Cigarettes    Start date: 1980    Quit date: 1995    Years since quittin.5   Smokeless Tobacco Never       Home Medications:   Prior to Admission medications    Medication Sig Start Date End Date Taking? Authorizing Provider   apixaban (Eliquis) 5 mg Take 1 tablet (5 mg total) by mouth 2 (two) times a day 25  Yes DAVID Davis   folic acid (FOLVITE) 400 mcg tablet Take 1 tablet (400 mcg total) by mouth daily 25  Yes Jared Duque MD   furosemide (LASIX) 40 mg tablet Take 1 tablet (40 mg total) by mouth daily 25  Yes DAVID Davis   metoprolol tartrate (LOPRESSOR) 50 mg tablet Take 1 tablet (50 mg total) by mouth every 12 (twelve) hours 25  Yes DAVID Davis   multivitamin (THERAGRAN) TABS Take 1 tablet by mouth in the morning.   Yes Historical Provider, MD   thiamine 100 MG tablet Take 1 tablet (100 mg total) by mouth daily 25  Yes Jared Duque MD       Allergies:  No Known Allergies    Review of Systems:  Review of Systems - History obtained from chart review and the patient  General ROS: positive for  - fatigue and change in activity tolerance   negative for - chills, fever, sleep disturbance, or weight gain  Psychological ROS: negative  Ophthalmic ROS: negative  ENT ROS: negative  Allergy and Immunology ROS: negative  Hematological and Lymphatic ROS: negative  Endocrine ROS: negative  Breast ROS: negative  Respiratory ROS: no cough,  "shortness of breath, or wheezing  Cardiovascular ROS: positive for - dyspnea on exertion, edema, and palpitations  negative for - chest pain, loss of consciousness, orthopnea, paroxysmal nocturnal dyspnea, or rapid heart rate  Gastrointestinal ROS: no abdominal pain, change in bowel habits, or black or bloody stools  Genito-Urinary ROS: no dysuria, trouble voiding, or hematuria  Musculoskeletal ROS: negative  Neurological ROS: no TIA or stroke symptoms  Dermatological ROS: negative    Vital Signs:     Vitals:    07/08/25 1351 07/08/25 1352   BP: 94/58 (!) 85/52   BP Location: Right arm Left arm   Patient Position: Sitting Sitting   Cuff Size: Standard Standard   Pulse: 78    SpO2: 98%    Weight: 66.3 kg (146 lb 3.2 oz)    Height: 5' 4\" (1.626 m)        Physical Exam:    General: Alert, oriented, well developed, no acute distress  HEENT/NECK:  PERRLA.  No jugular venous distention.    Cardiac:Regular rate and rhythm, II/VI systolic murmur at apex.  Carotid arteries: 2+ pulses, no bruits  Pulmonary:  Breath sounds clear bilaterally.  Abdomen:  Non-tender, Non-distended.  Positive bowel sounds.   Upper extremities: 2+ radial pulses; brisk capillary refill  Lower extremities: Extremities warm/dry. PT/DP pulses 2+ bilaterally.  Trace edema B/L  Neuro: Alert and oriented X 3.  Sensation is grossly intact.  No focal deficits.  Musculoskeletal: MAEE, stable gait  Skin: Warm/Dry, without rashes or lesions.    Lab Results:   Lab Results   Component Value Date    WBC 3.45 (L) 05/30/2025    HGB 13.9 05/30/2025    HCT 41.9 05/30/2025     (H) 05/30/2025     05/30/2025     Lab Results   Component Value Date    SODIUM 139 05/31/2025    K 4.4 05/31/2025     05/31/2025    CO2 31 05/31/2025    AGAP 5 05/31/2025    BUN 10 05/31/2025    CREATININE 0.58 (L) 05/31/2025    GLUC 96 05/31/2025    CALCIUM 8.4 05/31/2025    AST 33 05/28/2025    ALT 43 05/28/2025    ALKPHOS 61 05/28/2025    TP 5.3 (L) 05/28/2025    TBILI " 1.78 (H) 2025    EGFR 97 2025     Lab Results   Component Value Date    INR 1.16 2025    PROTIME 15.6 (H) 2025     Lab Results   Component Value Date    VGH1SDLQUNCC 1.340 2025       Imaging Studies:     EC25    NSR    WILFREDO: 25      Left Ventricle: Left ventricular cavity size is normal. Wall thickness is normal. The left ventricular ejection fraction is 60% by visual estimation. Systolic function is normal. Wall motion is normal.    Left Atrium: The atrium is dilated. There is no thrombus.    Atrial Septum: No patent foramen ovale detected, confirmed at rest using color doppler.    Left Atrial Appendage: There is a windsock appearance. There is reduced function. There is no thrombus. There is no spontaneous echo contrast.    Mitral Valve: The lateral segment (P1) and middle segment (P2) of the posterior leaflet are prolapsed. Coaptation gap is the worst at lateral A2/P2 (2 mm). There is severe regurgitation with an eccentrically directed jet. MV ERO is 0.49 cm2.    Tricuspid Valve: There is mild regurgitation.    Aorta: The aortic root is normal in size. The ascending aorta is mildly dilated. Ao root is 3.00 cm.     Echocardiogram: 25      Left Ventricle: Left ventricular cavity size is mildly dilated. Wall thickness is normal. The left ventricular ejection fraction is 55%. Systolic function is normal. Although no diagnostic regional wall motion abnormality was identified, this possibility cannot be completely excluded on the basis of this study.    Right Ventricle: Systolic function is mildly reduced.    Left Atrium: The atrium is moderately dilated.    Mitral Valve: There is mild annular calcification. The posterior leaflet is prolapsed. There is severe regurgitation with an anteriorly directed jet.    Tricuspid Valve: There is mild regurgitation.    Pericardium: There is a trivial pericardial effusion.     Severe mitral regurgitation likely due to posterior MV  leaflet prolapse. Recommend WILFREDO for further evaluation.     Findings    Left Ventricle Left ventricular cavity size is mildly dilated. Wall thickness is normal. The left ventricular ejection fraction is 55%. Systolic function is normal. Although no diagnostic regional wall motion abnormality was identified, this possibility cannot be completely excluded on the basis of this study. Unable to assess diastolic function due to atrial fibrillation.   Right Ventricle Right ventricular cavity size is normal. Systolic function is mildly reduced.   Left Atrium The atrium is moderately dilated.   Right Atrium The atrium is normal in size.   Aortic Valve The aortic valve is trileaflet. The leaflets are not thickened. The leaflets are mildly calcified. The leaflets exhibit normal mobility. There is no evidence of regurgitation. The aortic valve has no significant stenosis.   Mitral Valve The leaflets are not thickened. There is mild annular calcification. The posterior leaflet is prolapsed. There is severe regurgitation with an anteriorly directed jet. There is no evidence of stenosis.   Tricuspid Valve The leaflets are not thickened. The leaflets are not calcified. The leaflets exhibit normal mobility. There is mild regurgitation. There is no evidence of stenosis. The estimated right ventricular systolic pressure is 34.00 mmHg.   Pulmonic Valve Pulmonic valve structure is normal. The leaflets are not thickened. There is no calcification. The leaflets exhibit normal mobility. There is trace regurgitation. There is no evidence of stenosis.   Ascending Aorta The aortic root is normal in size.   IVC/SVC The right atrial pressure is estimated at 3.0 mmHg. The inferior vena cava is normal in size. Respirophasic changes were normal.   Pericardium There is a trivial pericardial effusion. The pericardium is normal in appearance.     Left Ventricle Measurements    Function/Volumes   A4C EF 53 %         Left ventricular stroke volume  (2D) 92 mL         Dimensions   LVIDd 5.9 cm         LVIDS 4.2 cm         IVSd 0.7 cm         LVPWd 0.8 cm         LVOT area 3.14 cm2         FS 29               Right Ventricle Measurements    Dimensions   RVID d 3.7 cm         Tricuspid annular plane systolic excursion 1.6 cm               Left Atrium Measurements    Dimensions   LA size 4.8 cm         BREANN A4C 31.7 cm2               Right Atrium Measurements    Dimensions   RAA A4C 15.6 cm2               Aortic Valve Measurements    Area/Dimensions   LVOT diameter 2 cm         LVOT area 3.14 cm2               Tricuspid Valve Measurements    RVSP Parameters   TR Peak Angelito 2.8 m/s         Est. RA pres 3 mmHg         Triscuspid Valve Regurgitation Peak Gradient 31 mmHg         Right Ventricular Peak Systolic Pressure 34 mmHg               Aorta Measurements    Aortic Dimensions   Ao root 3.3 cm               RUQ U/S 5/28/25    IMPRESSION:     Aside from a known right pleural effusion, unremarkable right upper quadrant ultrasound.  Specifically, the liver appears normal without intrahepatic biliary dilation.       CTA/PE study: 5/27/25    IMPRESSION:     No pulmonary embolism seen     Moderate right effusion with small to moderate left effusion with groundglass haziness in the lungs, correlate with CHF     Nodular groundglass density right middle lobe, nonspecific may be due to edema, follow-up suggested at 3/6 months to demonstrate resolution        Prominent subcarinal and prevascular lymph nodes, reactive     Results Review Statement: I personally reviewed the following image studies in PACS and associated radiology reports: TTE, WILFREDO, CTA, RUQ U/S. My interpretation of the radiology images/reports is: MR.    Assessment:  Patient Active Problem List    Diagnosis Date Noted    Severe mitral regurgitation 07/08/2025    Hypokalemia 05/28/2025    CHF exacerbation (HCC) 05/27/2025    Atrial fibrillation with RVR ([facility]) 05/27/2025    Pleural effusion 05/27/2025    Alcohol  use disorder 05/27/2025    Anxiety and depression 04/27/2020    Anxiety, generalized 04/27/2020     Cardiology notes reviewed    Impression/Plan:    Severe Mitral Regurgitation  New onset Atrial Fibrillation  New onset HFpEF    Risks and benefits of mitral valve repair vs replacement were discussed in detail today with the patient and .  They understand and wish to proceed with further workup and surgical intervention.  We have ordered routine preoperative cardiac cath, carotid duplex, non-contrast Chest CT (to assess MAC), and dental clearance.  Patient will return for follow-up after testing completed to review results and discuss surgical treatment plan.    Kourtney Jimenez was comfortable with our recommendations, and their questions were answered to their satisfaction.  Thank you for allowing us to participate in the care of this patient.     Colonoscopy to be addressed after recovery from valve surgery.    SIGNATURE: DAVID Buchanan  DATE: July 8, 2025  TIME: 1:57 PM

## 2025-07-11 ENCOUNTER — TELEPHONE (OUTPATIENT)
Dept: CARDIAC SURGERY | Facility: CLINIC | Age: 65
End: 2025-07-11

## 2025-07-11 DIAGNOSIS — I34.81 MITRAL ANNULAR CALCIFICATION: ICD-10-CM

## 2025-07-11 DIAGNOSIS — I34.0 SEVERE MITRAL REGURGITATION: Primary | ICD-10-CM

## 2025-07-11 NOTE — TELEPHONE ENCOUNTER
Please reach out to patient to schedule a cardiac cath to be done at [facility]. Thank you!

## 2025-07-11 NOTE — LETTER
2025       Kourtney Jimenez              : 1960        MRN: [id number]  Onelia Garcia [facility] [id number]     Procedure Name:   CARDIAC  CATHETERIZATION    Procedure date: 2025    Procedure Location:   [place]: [address] [place] [id number] - Please call to Short Stay Center [phone] if not get call by 5.00pm.     LABS TO BE DONE BEFORE  2025   TO AVOID ANY CANCELLATION ON YOUR PROCEDURE    Special Instructions:    Medication hold:   ELIQUIS: DO NOT take this medication the night prior of the procedure and the morning of the procedure.     LASIX (FUROSEMIDE): DO NOT take this medication the morning of the procedure.     The hospital will contact you the PRIOR BUSINESS DAY to your procedure, usually between 4PM - 6PM to instruct you on the time and place to report. If you do not hear from a [facility]'s  by 6PM the evening prior to your procedure, please contact the campus that you are scheduled at.       You may have NOTHING to eat or drink from midnight the night prior to your procedure including candy & gum.  You may have a SIP of water with your morning medications.   DO NOT stop taking Plavix or Aspirin unless advised otherwise.      Arrange for a responsible person to drive you to and from the hospital.    Please notify us if you got a NEW MEDICATION prescribed prior to your procedure or have been admitted to the hospital within 30 days.    Please shower your procedure and do not use powders or lotions.    Please notify us if you have been admitted to the hospital within the past 30 days.    Bring a list of daily medications, vitamins, minerals, herbals and nutritional supplements you take. Include dosage and time you take them each day.    If packing an overnight bag, pack minimal clothing, you will be given hospital sleepwear. Do not bring money, valuables or jewelry. Wedding band is OK.    If you use CPAP machine, bring it to the hospital.      Have your  Photo ID and Insurance cards with you.    DO NOT take any diabetic medication, including insulin, the morning of the procedure. Oral diabetic medications may include: Glucophage, Prandin, Glyburide, Micronase, Avandia, Glocovance, Precose, Glynase y Starlix.    You should continue to take your morning dose of heart and/or blood pressure medications with a sip of water UNLESS ADVISED OTHERWISE.    If you develop a cold, sore throat, fever or any other illness prior to your procedure date, notify your surgeon immediately            Thank you,   Kaylin Ness  Surgery Coordinator  [facility]   [address] [place] [id number]  Ph: [phone]

## 2025-07-14 NOTE — TELEPHONE ENCOUNTER
Patient scheduled for  [facility] on 08/05/2025   with Dr Sethi.  Patient aware of general instructions; labs test required.   Meds holds:   Eliquis to hold the night prior and Am of.  Lasix to hold Am of.    Key, can you please mail out instructions and labs to patient.  Thank you.    Dr Callejas, can you please place the prep for procedure for this patient cardiac cath.  Thank you.

## 2025-07-16 NOTE — TELEPHONE ENCOUNTER
Dr Callejas, can you please place the prep for procedure for this patient cardiac cath.  Thank you.

## 2025-07-18 ENCOUNTER — PREP FOR PROCEDURE (OUTPATIENT)
Dept: CARDIOLOGY CLINIC | Facility: CLINIC | Age: 65
End: 2025-07-18

## 2025-07-18 DIAGNOSIS — I34.0 NONRHEUMATIC MITRAL VALVE REGURGITATION: Primary | ICD-10-CM

## 2025-07-25 ENCOUNTER — APPOINTMENT (OUTPATIENT)
Dept: LAB | Facility: AMBULARY SURGERY CENTER | Age: 65
End: 2025-07-25
Payer: MEDICARE

## 2025-07-25 DIAGNOSIS — I34.81 MITRAL ANNULAR CALCIFICATION: ICD-10-CM

## 2025-07-25 DIAGNOSIS — I34.0 SEVERE MITRAL REGURGITATION: ICD-10-CM

## 2025-07-25 DIAGNOSIS — I50.9 CHF (CONGESTIVE HEART FAILURE) (HCC): ICD-10-CM

## 2025-07-25 LAB
ALBUMIN SERPL BCG-MCNC: 4.1 G/DL (ref 3.5–5)
ALP SERPL-CCNC: 74 U/L (ref 34–104)
ALT SERPL W P-5'-P-CCNC: 22 U/L (ref 7–52)
ANION GAP SERPL CALCULATED.3IONS-SCNC: 10 MMOL/L (ref 4–13)
AST SERPL W P-5'-P-CCNC: 31 U/L (ref 13–39)
BASOPHILS # BLD AUTO: 0.04 THOUSANDS/ÂΜL (ref 0–0.1)
BASOPHILS NFR BLD AUTO: 1 % (ref 0–1)
BILIRUB SERPL-MCNC: 1.24 MG/DL (ref 0.2–1)
BUN SERPL-MCNC: 10 MG/DL (ref 5–25)
CALCIUM SERPL-MCNC: 9.5 MG/DL (ref 8.4–10.2)
CHLORIDE SERPL-SCNC: 99 MMOL/L (ref 96–108)
CO2 SERPL-SCNC: 32 MMOL/L (ref 21–32)
CREAT SERPL-MCNC: 0.62 MG/DL (ref 0.6–1.3)
EOSINOPHIL # BLD AUTO: 0.08 THOUSAND/ÂΜL (ref 0–0.61)
EOSINOPHIL NFR BLD AUTO: 1 % (ref 0–6)
ERYTHROCYTE [DISTWIDTH] IN BLOOD BY AUTOMATED COUNT: 12.8 % (ref 11.6–15.1)
GFR SERPL CREATININE-BSD FRML MDRD: 94 ML/MIN/1.73SQ M
GLUCOSE SERPL-MCNC: 96 MG/DL (ref 65–140)
HCT VFR BLD AUTO: 42.8 % (ref 34.8–46.1)
HGB BLD-MCNC: 14.2 G/DL (ref 11.5–15.4)
IMM GRANULOCYTES # BLD AUTO: 0.02 THOUSAND/UL (ref 0–0.2)
IMM GRANULOCYTES NFR BLD AUTO: 0 % (ref 0–2)
LYMPHOCYTES # BLD AUTO: 1.03 THOUSANDS/ÂΜL (ref 0.6–4.47)
LYMPHOCYTES NFR BLD AUTO: 17 % (ref 14–44)
MCH RBC QN AUTO: 32.5 PG (ref 26.8–34.3)
MCHC RBC AUTO-ENTMCNC: 33.2 G/DL (ref 31.4–37.4)
MCV RBC AUTO: 98 FL (ref 82–98)
MONOCYTES # BLD AUTO: 0.73 THOUSAND/ÂΜL (ref 0.17–1.22)
MONOCYTES NFR BLD AUTO: 12 % (ref 4–12)
NEUTROPHILS # BLD AUTO: 4.03 THOUSANDS/ÂΜL (ref 1.85–7.62)
NEUTS SEG NFR BLD AUTO: 69 % (ref 43–75)
NRBC BLD AUTO-RTO: 0 /100 WBCS
PLATELET # BLD AUTO: 186 THOUSANDS/UL (ref 149–390)
PMV BLD AUTO: 10.7 FL (ref 8.9–12.7)
POTASSIUM SERPL-SCNC: 3.7 MMOL/L (ref 3.5–5.3)
PROT SERPL-MCNC: 6.5 G/DL (ref 6.4–8.4)
RBC # BLD AUTO: 4.37 MILLION/UL (ref 3.81–5.12)
SODIUM SERPL-SCNC: 141 MMOL/L (ref 135–147)
WBC # BLD AUTO: 5.93 THOUSAND/UL (ref 4.31–10.16)

## 2025-07-25 PROCEDURE — 85025 COMPLETE CBC W/AUTO DIFF WBC: CPT

## 2025-07-25 PROCEDURE — 80053 COMPREHEN METABOLIC PANEL: CPT

## 2025-07-25 PROCEDURE — 36415 COLL VENOUS BLD VENIPUNCTURE: CPT

## 2025-07-31 DIAGNOSIS — I48.91 A-FIB (HCC): ICD-10-CM

## 2025-07-31 DIAGNOSIS — I50.9 CHF (CONGESTIVE HEART FAILURE) (HCC): ICD-10-CM

## 2025-08-04 DIAGNOSIS — I48.91 A-FIB (HCC): ICD-10-CM

## 2025-08-04 DIAGNOSIS — I50.9 CHF (CONGESTIVE HEART FAILURE) (HCC): ICD-10-CM

## 2025-08-04 RX ORDER — FOLIC ACID 0.4 MG
400 TABLET ORAL DAILY
Qty: 30 TABLET | Refills: 1 | Status: CANCELLED | OUTPATIENT
Start: 2025-08-04

## 2025-08-04 RX ORDER — LANOLIN ALCOHOL/MO/W.PET/CERES
100 CREAM (GRAM) TOPICAL DAILY
Qty: 30 TABLET | Refills: 1 | Status: CANCELLED | OUTPATIENT
Start: 2025-08-04

## 2025-08-04 RX ORDER — LANOLIN ALCOHOL/MO/W.PET/CERES
100 CREAM (GRAM) TOPICAL DAILY
Qty: 30 TABLET | Refills: 0 | OUTPATIENT
Start: 2025-08-04

## 2025-08-04 RX ORDER — FOLIC ACID 0.4 MG
400 TABLET ORAL DAILY
Qty: 30 TABLET | Refills: 0 | OUTPATIENT
Start: 2025-08-04

## 2025-08-12 ENCOUNTER — HOSPITAL ENCOUNTER (OUTPATIENT)
Dept: CT IMAGING | Facility: HOSPITAL | Age: 65
Discharge: HOME | End: 2025-08-12
Attending: NURSE PRACTITIONER
Payer: MEDICARE

## 2025-08-12 ENCOUNTER — APPOINTMENT (OUTPATIENT)
Dept: RADIOLOGY | Facility: HOSPITAL | Age: 65
End: 2025-08-12
Payer: MEDICARE

## 2025-08-12 ENCOUNTER — HOSPITAL ENCOUNTER (OUTPATIENT)
Dept: VASCULAR ULTRASOUND | Facility: HOSPITAL | Age: 65
Discharge: HOME | End: 2025-08-12
Attending: NURSE PRACTITIONER
Payer: MEDICARE

## 2025-08-19 ENCOUNTER — TELEPHONE (OUTPATIENT)
Dept: CARDIAC SURGERY | Facility: CLINIC | Age: 65
End: 2025-08-19